# Patient Record
Sex: FEMALE | Race: WHITE | Employment: UNEMPLOYED | URBAN - METROPOLITAN AREA
[De-identification: names, ages, dates, MRNs, and addresses within clinical notes are randomized per-mention and may not be internally consistent; named-entity substitution may affect disease eponyms.]

---

## 2017-04-06 ENCOUNTER — TELEPHONE (OUTPATIENT)
Dept: FAMILY MEDICINE CLINIC | Age: 52
End: 2017-04-06

## 2017-04-18 ENCOUNTER — OFFICE VISIT (OUTPATIENT)
Dept: FAMILY MEDICINE CLINIC | Age: 52
End: 2017-04-18

## 2017-04-18 VITALS
WEIGHT: 189.8 LBS | SYSTOLIC BLOOD PRESSURE: 122 MMHG | HEART RATE: 68 BPM | DIASTOLIC BLOOD PRESSURE: 76 MMHG | RESPIRATION RATE: 12 BRPM | BODY MASS INDEX: 31.62 KG/M2 | HEIGHT: 65 IN

## 2017-04-18 DIAGNOSIS — Z00.00 WELL ADULT HEALTH CHECK: Primary | ICD-10-CM

## 2017-04-18 PROCEDURE — 99386 PREV VISIT NEW AGE 40-64: CPT | Performed by: FAMILY MEDICINE

## 2017-04-18 RX ORDER — FLUOXETINE HYDROCHLORIDE 20 MG/1
20 CAPSULE ORAL DAILY
Status: ON HOLD | COMMUNITY
End: 2017-10-31 | Stop reason: ALTCHOICE

## 2017-04-18 ASSESSMENT — ENCOUNTER SYMPTOMS
RESPIRATORY NEGATIVE: 1
GASTROINTESTINAL NEGATIVE: 1

## 2017-10-31 ENCOUNTER — ANESTHESIA EVENT (OUTPATIENT)
Dept: ENDOSCOPY | Age: 52
End: 2017-10-31
Payer: COMMERCIAL

## 2017-10-31 ENCOUNTER — HOSPITAL ENCOUNTER (OUTPATIENT)
Age: 52
Setting detail: OUTPATIENT SURGERY
Discharge: HOME | End: 2017-10-31
Attending: INTERNAL MEDICINE | Admitting: INTERNAL MEDICINE
Payer: COMMERCIAL

## 2017-10-31 ENCOUNTER — ANESTHESIA (OUTPATIENT)
Dept: ENDOSCOPY | Age: 52
End: 2017-10-31
Payer: COMMERCIAL

## 2017-10-31 VITALS
HEART RATE: 74 BPM | DIASTOLIC BLOOD PRESSURE: 76 MMHG | WEIGHT: 178 LBS | BODY MASS INDEX: 30.39 KG/M2 | OXYGEN SATURATION: 100 % | TEMPERATURE: 98.1 F | RESPIRATION RATE: 16 BRPM | HEIGHT: 64 IN | SYSTOLIC BLOOD PRESSURE: 123 MMHG

## 2017-10-31 VITALS
SYSTOLIC BLOOD PRESSURE: 113 MMHG | OXYGEN SATURATION: 99 % | DIASTOLIC BLOOD PRESSURE: 55 MMHG | RESPIRATION RATE: 16 BRPM

## 2017-10-31 PROCEDURE — 7100000000 HC PACU RECOVERY - FIRST 15 MIN: Performed by: INTERNAL MEDICINE

## 2017-10-31 PROCEDURE — 3609027000 HC COLONOSCOPY: Performed by: INTERNAL MEDICINE

## 2017-10-31 PROCEDURE — 3700000001 HC ADD 15 MINUTES (ANESTHESIA): Performed by: INTERNAL MEDICINE

## 2017-10-31 PROCEDURE — 3700000000 HC ANESTHESIA ATTENDED CARE: Performed by: INTERNAL MEDICINE

## 2017-10-31 PROCEDURE — 2580000003 HC RX 258: Performed by: INTERNAL MEDICINE

## 2017-10-31 PROCEDURE — 88305 TISSUE EXAM BY PATHOLOGIST: CPT

## 2017-10-31 PROCEDURE — 3609010600 HC COLONOSCOPY POLYPECTOMY SNARE/COLD BIOPSY: Performed by: INTERNAL MEDICINE

## 2017-10-31 PROCEDURE — 6360000002 HC RX W HCPCS: Performed by: NURSE ANESTHETIST, CERTIFIED REGISTERED

## 2017-10-31 PROCEDURE — 7100000001 HC PACU RECOVERY - ADDTL 15 MIN: Performed by: INTERNAL MEDICINE

## 2017-10-31 PROCEDURE — 2500000003 HC RX 250 WO HCPCS: Performed by: NURSE ANESTHETIST, CERTIFIED REGISTERED

## 2017-10-31 RX ORDER — SODIUM CHLORIDE 450 MG/100ML
INJECTION, SOLUTION INTRAVENOUS CONTINUOUS
Status: DISCONTINUED | OUTPATIENT
Start: 2017-10-31 | End: 2017-10-31 | Stop reason: HOSPADM

## 2017-10-31 RX ORDER — PROPOFOL 10 MG/ML
INJECTION, EMULSION INTRAVENOUS PRN
Status: DISCONTINUED | OUTPATIENT
Start: 2017-10-31 | End: 2017-10-31 | Stop reason: SDUPTHER

## 2017-10-31 RX ORDER — LIDOCAINE HYDROCHLORIDE 20 MG/ML
INJECTION, SOLUTION INFILTRATION; PERINEURAL PRN
Status: DISCONTINUED | OUTPATIENT
Start: 2017-10-31 | End: 2017-10-31 | Stop reason: SDUPTHER

## 2017-10-31 RX ORDER — M-VIT,TX,IRON,MINS/CALC/FOLIC 27MG-0.4MG
1 TABLET ORAL DAILY
COMMUNITY

## 2017-10-31 RX ADMIN — PROPOFOL 50 MG: 10 INJECTION, EMULSION INTRAVENOUS at 12:43

## 2017-10-31 RX ADMIN — PROPOFOL 50 MG: 10 INJECTION, EMULSION INTRAVENOUS at 12:57

## 2017-10-31 RX ADMIN — PROPOFOL 50 MG: 10 INJECTION, EMULSION INTRAVENOUS at 12:37

## 2017-10-31 RX ADMIN — SODIUM CHLORIDE: 4.5 INJECTION, SOLUTION INTRAVENOUS at 12:15

## 2017-10-31 RX ADMIN — PROPOFOL 50 MG: 10 INJECTION, EMULSION INTRAVENOUS at 12:38

## 2017-10-31 RX ADMIN — LIDOCAINE HYDROCHLORIDE 60 MG: 20 INJECTION, SOLUTION INFILTRATION; PERINEURAL at 12:37

## 2017-10-31 RX ADMIN — PROPOFOL 50 MG: 10 INJECTION, EMULSION INTRAVENOUS at 12:51

## 2017-10-31 ASSESSMENT — PAIN SCALES - GENERAL: PAINLEVEL_OUTOF10: 0

## 2017-10-31 NOTE — ANESTHESIA PRE PROCEDURE
12.8 oz (86.1 kg)     Body mass index is 30.55 kg/m². CBC: No results found for: WBC, RBC, HGB, HCT, MCV, RDW, PLT    CMP: No results found for: NA, K, CL, CO2, BUN, CREATININE, GFRAA, AGRATIO, LABGLOM, GLUCOSE, PROT, CALCIUM, BILITOT, ALKPHOS, AST, ALT    POC Tests: No results for input(s): POCGLU, POCNA, POCK, POCCL, POCBUN, POCHEMO, POCHCT in the last 72 hours. Coags: No results found for: PROTIME, INR, APTT    HCG (If Applicable): No results found for: PREGTESTUR, PREGSERUM, HCG, HCGQUANT     ABGs: No results found for: PHART, PO2ART, JBM3PGX, YTU6EVB, BEART, F4YIWVJE     Type & Screen (If Applicable):  No results found for: LABABO, [address]    Anesthesia Evaluation  Patient summary reviewed and Nursing notes reviewed no history of anesthetic complications:   Airway: Mallampati: II  TM distance: >3 FB   Neck ROM: full  Mouth opening: > = 3 FB Dental:          Pulmonary:negative ROS and normal exam                               Cardiovascular:  Exercise tolerance: good (>4 METS),                     Neuro/Psych:   {neg ROS              GI/Hepatic/Renal:   (+) bowel prep          Endo/Other: negative ROS                    Abdominal:           Vascular:                                      Anesthesia Plan      MAC     ASA 1       Induction: intravenous. Anesthetic plan and risks discussed with patient. Plan discussed with attending.                   Kodak Pantoja CRNA   10/31/2017

## 2017-10-31 NOTE — CONSULTS
[address] [facility]  Sedation/Analgesia Post Sedation Record    Patient: Heaven Lang : 1965  Med Rec#: [id number] Acc#: [id number]   Procedure Performed By: Flash Bowers  Primary Care Physician: Robert Diego DO    POST-PROCEDURE    Physicians/Assistants: Flash Bowers  Procedure Performed:    Sedation/Anesthesia:     Estimated Blood Loss:          ml  Specimens Removed:  []None [x]Other:      Disposition of Specimen:  [x]Pathology []Other      Complications:   [x]None Immediate []Other:     Post-procedure Diagnosis/Findings:           Polyps  Recommendations:               Flash Bowers MD [facility]  Electronically signed 10/31/2017 at 1:05 PM

## 2017-10-31 NOTE — ANESTHESIA POSTPROCEDURE EVALUATION
Department of Anesthesiology  Postprocedure Note    Patient: Tejal Mansfield  MRN: [id number]  YOB: 1965  Date of evaluation: 10/31/2017  Time:  1:02 PM     Procedure Summary     Date:  10/31/17 Room / Location:  [address] ENDO 6 / [address] Endoscopy    Anesthesia Start:  1234 Anesthesia Stop:  0056    Procedure:  COLONOSCOPY (N/A Anus) Diagnosis:  (SCREENING)    Surgeon:  Dio Cardenas MD Responsible Provider:  Lula Pulido MD    Anesthesia Type:  MAC ASA Status:  1          Anesthesia Type: MAC    Tran Phase I: Tran Score: 10    Tran Phase II:      Last vitals: Reviewed and per EMR flowsheets.        Anesthesia Post Evaluation    Patient location during evaluation: bedside  Patient participation: complete - patient participated  Level of consciousness: awake and alert  Airway patency: patent  Nausea & Vomiting: no nausea and no vomiting  Complications: no  Cardiovascular status: hemodynamically stable  Respiratory status: acceptable, room air and spontaneous ventilation  Hydration status: euvolemic

## 2017-10-31 NOTE — CONSULTS
[facility]  Sedation/Analgesia History & Physical    Patient: Niru Francois : 1965  Med Rec#: [id number] Acc#: [id number]   Provider Performing Procedure: Erwin Khan  Primary Care Physician: Candice Carlson DO    PRE-PROCEDURE   Full CODE [x]Yes  DNR-CCA/DNR-CC []Yes   Brief History/Pre-Procedure Diagnosis:sc         MEDICAL HISTORY  []CAD/Valve  []Liver Disease  []Lung Disease []Diabetes  []Hypertension []Renal Disease  []Additional information:       has a past medical history of Congenital kidney disease. SURGICAL HISTORY   has a past surgical history that includes  section ( & ). Additional information:       ALLERGIES   Allergies as of 2017    (No Known Allergies)     Additional information:       MEDICATIONS   Coumadin Use Last 7 Days [x]No []Yes  Antiplatelet drug therapy use last 7 days  [x]No []Yes  Other anticoagulant use last 7 days  [x]No []Yes  No current facility-administered medications for this encounter. Prior to Admission medications    Medication Sig Start Date End Date Taking? Authorizing Provider   FLUoxetine (PROZAC) 20 MG capsule Take 20 mg by mouth daily Take 14 days prior to start of menses    Historical Provider, MD     Additional information:       PHYSICAL:   Heart:  [x]Regular rate and rhythm  []Other:    Lungs:  [x]Clear    []Other:    Abdomen: [x]Soft    []Other:    Mental Status: [x]Alert & Oriented  []Other:      VITAL SIGNS   No data found. PLANNED PROCEDURE  []EGD  [x]Colonoscopy []Flex Sigmoid  []ERCP []EUS   []Cystoscopy  [] CATH [] BRONCH   Consent: I have discussed with the patient and/or the patient representative the indication, alternatives, and the possible risks and/or complications of the planned procedure and the anesthesia methods. The patient and/or patient representative appear to understand and agree to proceed.   SEDATION  Planned agent:[]Midazolam []Meperidine []Sublimaze []Morphine  []Diazepam [x]Propofol  []Other:     ASA Classification: Class 1 - A normal healthy patient    Airway Assessment: normal    Monitoring and Safety: The patient will be placed on a cardiac monitor and vital signs, pulse oximetry and level of consciousness will be continuously evaluated throughout the procedure. The patient will be closely monitored until recovery from the medications is complete and the patient has returned to baseline status. Respiratory therapy will be on standby during the procedure. [x]Pre-procedure diagnostic studies complete and results available. Comment:    [x]Previous sedation/anesthesia experiences assessed. Comment:    [x]The patient is an appropriate candidate to undergo the planned procedure sedation and anesthesia. (Refer to nursing sedation/analgesia documentation record)  [x]Formulation and discussion of sedation/procedure plan, risks, and expectations with patient and/or responsible adult completed. [x]Patient examined immediately prior to the procedure.  (Refer to nursing sedation/analgesia documentation record)    Trace Navas MD   Electronically signed 10/31/2017 at 11:52 AM

## 2017-11-01 NOTE — PROCEDURES
[address] [place] [address]                                PROCEDURE NOTE    PATIENT NAME: Donivan Dubin                      :             1965  MED REC NO:   [id number]                            ROOM:  ACCOUNT NO:   [de-identified]                            ADMISSION DATE:  10/31/2017  PROVIDER:     Lindsey Arroyo M.D.    DATE OF PROCEDURE:    PROCEDURE:  Colonoscopy plus snare plus biopsy. INDICATION FOR THE PROCEDURE:  The patient is here for screening  colonoscopy. Grandfather with colon cancer. The patient is enjoying  good health. See the preop note for rest of clinicals. ASA CLASSIFICATION:  I.    MEDICATION:  Per anesthesia. BIOPSY:  Yes. PHOTOGRAPHS:  Yes. DESCRIPTION OF THE PROCEDURE:  Informed consent was obtained after  explaining risks and benefits of the procedure and conscious sedation. Possible complications including bleeding, perforation, reaction to  medicine, but not limiting to death were discussed. Afterwards, the  patient was sedated in incremental fashion and PCF-180 colonoscope was  advanced under direct visualization to the cecum. There was a small  flat polyp close to appendicular orifice was removed with cold biopsy. Cold snare was tried, but it will slip, there was complete removal.   In the ascending colon, there was a fat polyp highly suspicious for  serrated adenoma, removed with cold snare. Transverse colon and  descending were normal, but in sigmoid there were 2 polyps next to  each other, one removed with cold snare and one with cold biopsy. Rectum was normal.  Tolerated the procedure well. IMPRESSION:  Multiple colon polyps total of 4, polypectomy plus cold  biopsy. RECOMMENDATION:  Followup colonoscopy in 3 years.         Kit Stapleton M.D.    D: 10/31/2017 13:15:05       T: 10/31/2017 14:27:43     [email or web address]  Job#: [id number]     Doc#: [id number]    CC:   physician.

## 2017-11-07 ENCOUNTER — TELEPHONE (OUTPATIENT)
Dept: FAMILY MEDICINE CLINIC | Age: 52
End: 2017-11-07

## 2018-04-07 LAB
CHOLESTEROL, TOTAL: 203 MG/DL
CHOLESTEROL/HDL RATIO: 4.8
HDLC SERPL-MCNC: 42 MG/DL (ref 35–70)
LDL CHOLESTEROL CALCULATED: 124 MG/DL (ref 0–160)
TRIGL SERPL-MCNC: 185 MG/DL
VLDLC SERPL CALC-MCNC: 37 MG/DL

## 2018-07-05 ENCOUNTER — OFFICE VISIT (OUTPATIENT)
Dept: FAMILY MEDICINE CLINIC | Age: 53
End: 2018-07-05
Payer: COMMERCIAL

## 2018-07-05 VITALS
HEART RATE: 88 BPM | BODY MASS INDEX: 36.36 KG/M2 | WEIGHT: 185.2 LBS | SYSTOLIC BLOOD PRESSURE: 136 MMHG | DIASTOLIC BLOOD PRESSURE: 80 MMHG | HEIGHT: 60 IN | TEMPERATURE: 99.6 F | RESPIRATION RATE: 20 BRPM

## 2018-07-05 DIAGNOSIS — N10 ACUTE PYELONEPHRITIS: Primary | ICD-10-CM

## 2018-07-05 PROCEDURE — 99213 OFFICE O/P EST LOW 20 MIN: CPT | Performed by: FAMILY MEDICINE

## 2018-07-05 RX ORDER — LEVOFLOXACIN 750 MG/1
750 TABLET ORAL DAILY
Qty: 5 TABLET | Refills: 0 | Status: SHIPPED | OUTPATIENT
Start: 2018-07-05 | End: 2021-06-23 | Stop reason: SDUPTHER

## 2018-07-05 RX ORDER — FLUOXETINE HYDROCHLORIDE 20 MG/1
20 CAPSULE ORAL
COMMUNITY

## 2018-07-05 ASSESSMENT — ENCOUNTER SYMPTOMS
ABDOMINAL PAIN: 1
VOMITING: 1
RESPIRATORY NEGATIVE: 1
NAUSEA: 1

## 2018-07-05 ASSESSMENT — PATIENT HEALTH QUESTIONNAIRE - PHQ9
2. FEELING DOWN, DEPRESSED OR HOPELESS: 0
SUM OF ALL RESPONSES TO PHQ QUESTIONS 1-9: 0
1. LITTLE INTEREST OR PLEASURE IN DOING THINGS: 0
SUM OF ALL RESPONSES TO PHQ9 QUESTIONS 1 & 2: 0

## 2018-07-05 NOTE — PROGRESS NOTES
Visit Information    Have you changed or started any medications since your last visit including any over-the-counter medicines, vitamins, or herbal medicines? no   Are you having any side effects from any of your medications? -  no  Have you stopped taking any of your medications? Is so, why? -  no    Have you seen any other physician or provider since your last visit? No  Have you had any other diagnostic tests since your last visit? No  Have you been seen in the emergency room and/or had an admission to a hospital since we last saw you? No  Have you had your routine dental cleaning in the past 6 months? yes - 5/2018    Have you activated your [facility] account? If not, what are your barriers?  Yes     Patient Care Team:  Rosa Iyer DO as PCP - General (Family Medicine)    Medical History Review  Past Medical, Family, and Social History reviewed and does contribute to the patient presenting condition    Health Maintenance   Topic Date Due    Hepatitis C screen  1965    HIV screen  10/27/1980    DTaP/Tdap/Td vaccine (1 - Tdap) 10/27/1984    Cervical cancer screen  10/27/1986    Diabetes screen  10/27/2005    Shingles Vaccine (1 of 2 - 2 Dose Series) 10/27/2015    Flu vaccine (1) 09/01/2018    Breast cancer screen  02/06/2019    Lipid screen  04/07/2023    Colon cancer screen colonoscopy  10/31/2027
oriented to person, place, and time. She appears well-developed and well-nourished. She has a sickly appearance. HENT:   Head: Normocephalic and atraumatic. Right Ear: Tympanic membrane normal.   Left Ear: Tympanic membrane normal.   Mouth/Throat: Oropharynx is clear and moist and mucous membranes are normal.   Cardiovascular: Normal rate, regular rhythm and normal heart sounds. No murmur heard. Pulmonary/Chest: Effort normal and breath sounds normal.   Abdominal: Soft. Bowel sounds are normal. There is tenderness in the suprapubic area. There is CVA tenderness. Musculoskeletal: She exhibits no edema. Neurological: She is alert and oriented to person, place, and time. Skin: Skin is warm and dry. Psychiatric: She has a normal mood and affect. Her behavior is normal.   Nursing note and vitals reviewed. Assessment:       Diagnosis Orders   1.  Acute pyelonephritis  levofloxacin (LEVAQUIN) 750 MG tablet           Plan:      -  Rest, fluids  -  rx Levaquin sent  -  ED if no improvement in next 24-48 hrs

## 2018-07-07 LAB — URINE CULTURE, ROUTINE: ABNORMAL

## 2019-03-08 LAB
AVERAGE GLUCOSE: NORMAL
CHOLESTEROL, TOTAL: 215 MG/DL
CHOLESTEROL/HDL RATIO: NORMAL
HBA1C MFR BLD: 6 %
HDLC SERPL-MCNC: 38 MG/DL (ref 35–70)
LDL CHOLESTEROL CALCULATED: 137 MG/DL (ref 0–160)
TRIGL SERPL-MCNC: 201 MG/DL
TSH SERPL DL<=0.05 MIU/L-ACNC: 1.58 UIU/ML
VLDLC SERPL CALC-MCNC: NORMAL MG/DL

## 2019-05-06 ENCOUNTER — OFFICE VISIT (OUTPATIENT)
Dept: FAMILY MEDICINE CLINIC | Age: 54
End: 2019-05-06
Payer: COMMERCIAL

## 2019-05-06 VITALS
HEIGHT: 61 IN | RESPIRATION RATE: 12 BRPM | HEART RATE: 68 BPM | SYSTOLIC BLOOD PRESSURE: 136 MMHG | DIASTOLIC BLOOD PRESSURE: 88 MMHG | BODY MASS INDEX: 37.19 KG/M2 | WEIGHT: 197 LBS

## 2019-05-06 DIAGNOSIS — Z00.00 WELL ADULT HEALTH CHECK: Primary | ICD-10-CM

## 2019-05-06 PROCEDURE — 99396 PREV VISIT EST AGE 40-64: CPT | Performed by: FAMILY MEDICINE

## 2019-05-06 ASSESSMENT — PATIENT HEALTH QUESTIONNAIRE - PHQ9
SUM OF ALL RESPONSES TO PHQ QUESTIONS 1-9: 0
1. LITTLE INTEREST OR PLEASURE IN DOING THINGS: 0
SUM OF ALL RESPONSES TO PHQ QUESTIONS 1-9: 0
2. FEELING DOWN, DEPRESSED OR HOPELESS: 0
SUM OF ALL RESPONSES TO PHQ9 QUESTIONS 1 & 2: 0

## 2019-05-06 ASSESSMENT — ENCOUNTER SYMPTOMS
RESPIRATORY NEGATIVE: 1
GASTROINTESTINAL NEGATIVE: 1

## 2019-05-06 NOTE — PROGRESS NOTES
Chronic Disease Visit Information    BP Readings from Last 3 Encounters:   05/06/19 136/88   07/05/18 136/80   10/31/17 123/76          Hemoglobin A1C (%)   Date Value   03/08/2019 6.0     LDL Calculated (mg/dL)   Date Value   03/08/2019 137     HDL (mg/dL)   Date Value   03/08/2019 38            Have you changed or started any medications since your last visit including any over-the-counter medicines, vitamins, or herbal medicines? no   Are you having any side effects from any of your medications? -  no  Have you stopped taking any of your medications? Is so, why? -  no    Have you seen any other physician or provider since your last visit? No  Have you had any other diagnostic tests since your last visit? Yes - Records Obtained  Have you been seen in the emergency room and/or had an admission to a hospital since we last saw you? No  Have you had your annual diabetic retinal (eye) exam? n/a  Have you had your routine dental cleaning in the past 6 months? n/a    Have you activated your [employer] account? If not, what are your barriers?  Yes     Patient Care Team:  Dorita Burkett DO as PCP - General (Family Medicine)  Marcus Oneil MD as Obstetrician (Obstetrics & Gynecology)         Medical History Review  Past Medical, Family, and Social History reviewed and does contribute to the patient presenting condition    Health Maintenance   Topic Date Due    Hepatitis C screen  1965    HIV screen  10/27/1980    DTaP/Tdap/Td vaccine (1 - Tdap) 10/27/1984    Cervical cancer screen  10/27/1986    Shingles Vaccine (1 of 2) 10/27/2015    Breast cancer screen  02/06/2019    Flu vaccine (Season Ended) 09/01/2019    A1C test (Diabetic or Prediabetic)  03/08/2020    Lipid screen  03/08/2024    Colon cancer screen colonoscopy  10/31/2027    Pneumococcal 0-64 years Vaccine  Aged Out

## 2019-05-06 NOTE — PROGRESS NOTES
Subjective:      Patient ID: Luke Rivera is a 48 y.o. female. HPI:    Chief Complaint   Patient presents with    Annual Exam     NEEDS MAMMOGRAM    Results     Annual eval.  Doing well overall. BP well controlled. BP Readings from Last 3 Encounters:   19 136/88   18 136/80   10/31/17 123/76     Weight is up from this time last year. Wt Readings from Last 3 Encounters:   19 197 lb (89.4 kg)   18 185 lb 3.2 oz (84 kg)   10/31/17 178 lb (80.7 kg)     On Prozac every 2 weeks for PMS. Past Surgical History:   Procedure Laterality Date     SECTION   &     COLONOSCOPY  10/31/2017    CA COLON CA SCRN NOT  [address] IND N/A 10/31/2017    COLONOSCOPY performed by Jie Pean MD at [facility] W/RMVL OF TUMOR POLYP LESION [address] TQ  10/31/2017    COLONOSCOPY POLYPECTOMY SNARE/COLD BIOPSY performed by Jie Pena MD at [facility]     Prior to Admission medications    Medication Sig Start Date End Date Taking? Authorizing Provider   FLUoxetine (PROZAC) 20 MG capsule Take 20 mg by mouth Take one tablet 14 days out of the month   Yes Historical Provider, MD   Multiple Vitamins-Minerals (THERAPEUTIC MULTIVITAMIN-MINERALS) tablet Take 1 tablet by mouth daily   Yes Historical Provider, MD         Review of Systems   Constitutional: Negative. HENT: Negative. Respiratory: Negative. Cardiovascular: Negative. Gastrointestinal: Negative. Musculoskeletal: Negative. All other systems reviewed and are negative. Objective:   Physical Exam   Constitutional: She is oriented to person, place, and time. She appears well-developed and well-nourished. HENT:   Head: Normocephalic and atraumatic. Right Ear: Tympanic membrane normal.   Left Ear: Tympanic membrane normal.   Mouth/Throat: Oropharynx is clear and moist and mucous membranes are normal.   Neck: Carotid bruit is not present.    Cardiovascular: Normal rate, regular rhythm and normal heart sounds. No murmur heard. Pulmonary/Chest: Effort normal and breath sounds normal.   Abdominal: Soft. Bowel sounds are normal.   Musculoskeletal: She exhibits no edema. Neurological: She is alert and oriented to person, place, and time. Skin: Skin is warm and dry. Psychiatric: She has a normal mood and affect. Her behavior is normal.   Nursing note and vitals reviewed. Assessment:       Diagnosis Orders   1.  Well adult health check             Plan:      -  Healthy lifestyle discussed  -  Labs reviewed, look fine  -  Well female UTD  -  Due for mammogram, will set up on her own  -  RTO annually        Edwin Howard DO

## 2021-06-23 ENCOUNTER — VIRTUAL VISIT (OUTPATIENT)
Dept: FAMILY MEDICINE CLINIC | Age: 56
End: 2021-06-23
Payer: COMMERCIAL

## 2021-06-23 ENCOUNTER — TELEPHONE (OUTPATIENT)
Dept: FAMILY MEDICINE CLINIC | Age: 56
End: 2021-06-23

## 2021-06-23 DIAGNOSIS — N10 ACUTE PYELONEPHRITIS: ICD-10-CM

## 2021-06-23 PROCEDURE — 99214 OFFICE O/P EST MOD 30 MIN: CPT | Performed by: FAMILY MEDICINE

## 2021-06-23 RX ORDER — LEVOFLOXACIN 750 MG/1
750 TABLET ORAL DAILY
Qty: 5 TABLET | Refills: 0 | Status: SHIPPED | OUTPATIENT
Start: 2021-06-23 | End: 2021-06-28

## 2021-06-23 ASSESSMENT — ENCOUNTER SYMPTOMS
NAUSEA: 1
ABDOMINAL PAIN: 1
RESPIRATORY NEGATIVE: 1
VOMITING: 1

## 2021-06-23 NOTE — TELEPHONE ENCOUNTER
----- Message from Unknown Dills sent at 6/23/2021  8:06 AM EDT -----  Subject: Appointment Request    Reason for Call: Urgent Adult Urinary Problem    QUESTIONS  Type of Appointment? Established Patient  Reason for appointment request? Available appointments did not meet   patient need  Additional Information for Provider? Patient has been experiencing UTI   symptoms since Sunday that including fever, chills, body ache, smell w/   the urine and urgency. Wanting to know if can come in to give a sample.  ---------------------------------------------------------------------------  --------------  CALL BACK INFO  What is the best way for the office to contact you? OK to leave message on   voicemail  Preferred Call Back Phone Number? [phone]  ---------------------------------------------------------------------------  --------------  SCRIPT ANSWERS  Relationship to Patient? Self  Appointment reason? Symptomatic  Select script based on patient symptoms? Adult Urinary Symptoms   [Urine-related, UTI, Bladder Infection]  Are you having severe back pain with your urinary symptoms? No  Are you having vomiting or nausea? Yes  Have you been diagnosed with, awaiting test results for, or told that you   are suspected of having COVID-19 (Coronavirus)? (If patient has tested   negative or was tested as a requirement for work, school, or travel and   not based on symptoms, answer no)? No  Do you currently have flu-like symptoms including fever or chills, cough,   shortness of breath, difficulty breathing, or new loss of taste or smell?    Yes

## 2021-06-23 NOTE — PROGRESS NOTES
Emily James (:  1965) is a 54 y.o. female,Established patient, here for evaluation of the following chief complaint(s): Fever and Urinary Tract Infection           SUBJECTIVE/OBJECTIVE:  HPI:    Chief Complaint   Patient presents with    Fever    Urinary Tract Infection       Pt presents today with c/o dysuria, frequency, urgency, flank pain for the last 5 days. Took some Azo with minimal relief. Does admit to fever of 100.6. She has had an episode NV. Hx of pyelo in 2018. There is no problem list on file for this patient. Past Surgical History:   Procedure Laterality Date     SECTION   &     COLONOSCOPY  10/31/2017    [place] COLON CA SCRN NOT  [address] IND N/A 10/31/2017    COLONOSCOPY performed by Teodoro Lipscomb MD at [address] [place] [address] W/RMVL OF TUMOR POLYP LESION SNARE TQ  10/31/2017    COLONOSCOPY POLYPECTOMY SNARE/COLD BIOPSY performed by Teodoro Lipscomb MD at [facility]     Social History     Tobacco Use    Smoking status: Never Smoker    Smokeless tobacco: Never Used   Substance Use Topics    Alcohol use: No    Drug use: No         Review of Systems   Constitutional: Positive for fever. HENT: Negative. Respiratory: Negative. Cardiovascular: Negative. Gastrointestinal: Positive for abdominal pain, nausea and vomiting. Genitourinary: Positive for dysuria, flank pain, frequency and urgency. All other systems reviewed and are negative. No flowsheet data found. Physical Exam  Constitutional:       General: She is not in acute distress. Appearance: Normal appearance. She is well-developed. She is not ill-appearing. HENT:      Head: Normocephalic and atraumatic. Right Ear: External ear normal.      Left Ear: External ear normal.   Eyes:      Conjunctiva/sclera: Conjunctivae normal.   Pulmonary:      Effort: Pulmonary effort is normal. No respiratory distress. Skin:     Findings: No rash (on exposed surfaces). Neurological:      Mental Status: She is alert and oriented to person, place, and time. Psychiatric:         Mood and Affect: Mood normal.         Behavior: Behavior normal.         Thought Content: Thought content normal.         Judgment: Judgment normal.     ASSESSMENT/PLAN:  1. Acute pyelonephritis  -     levoFLOXacin (LEVAQUIN) 750 MG tablet; Take 1 tablet by mouth daily for 5 days, Disp-5 tablet, R-0Normal    -  Symptoms c/w pyelo  -  Will treat empirically with Levaquin 750 mg daily x 5 days  -  Push fluids  -  Tylenol prn fever  -  Recommend ED if worsening symptoms                   Shant Victor Thuan, was evaluated through a synchronous (real-time) audio-video encounter. The patient (or guardian if applicable) is aware that this is a billable service. Verbal consent to proceed has been obtained within the past 12 months. The visit was conducted pursuant to the emergency declaration under the [address] [place] [address] authority and the [employer] General Act. Patient identification was verified, and a caregiver was present when appropriate. The patient was located in a state where the provider was credentialed to provide care. An electronic signature was used to authenticate this note.     --Fermin Orozco, DO

## 2021-11-16 ENCOUNTER — HOSPITAL ENCOUNTER (OUTPATIENT)
Age: 56
Setting detail: OUTPATIENT SURGERY
Discharge: HOME | End: 2021-11-16
Attending: INTERNAL MEDICINE | Admitting: INTERNAL MEDICINE
Payer: COMMERCIAL

## 2021-11-16 ENCOUNTER — ANESTHESIA (OUTPATIENT)
Dept: ENDOSCOPY | Age: 56
End: 2021-11-16
Payer: COMMERCIAL

## 2021-11-16 ENCOUNTER — ANESTHESIA EVENT (OUTPATIENT)
Dept: ENDOSCOPY | Age: 56
End: 2021-11-16
Payer: COMMERCIAL

## 2021-11-16 VITALS
TEMPERATURE: 96.3 F | HEIGHT: 60 IN | WEIGHT: 197 LBS | DIASTOLIC BLOOD PRESSURE: 87 MMHG | OXYGEN SATURATION: 96 % | SYSTOLIC BLOOD PRESSURE: 153 MMHG | HEART RATE: 82 BPM | RESPIRATION RATE: 18 BRPM | BODY MASS INDEX: 38.68 KG/M2

## 2021-11-16 VITALS
DIASTOLIC BLOOD PRESSURE: 92 MMHG | RESPIRATION RATE: 26 BRPM | OXYGEN SATURATION: 95 % | SYSTOLIC BLOOD PRESSURE: 164 MMHG

## 2021-11-16 PROCEDURE — 3609027000 HC COLONOSCOPY: Performed by: INTERNAL MEDICINE

## 2021-11-16 PROCEDURE — 2580000003 HC RX 258: Performed by: INTERNAL MEDICINE

## 2021-11-16 PROCEDURE — 6360000002 HC RX W HCPCS: Performed by: REGISTERED NURSE

## 2021-11-16 PROCEDURE — 7100000010 HC PHASE II RECOVERY - FIRST 15 MIN: Performed by: INTERNAL MEDICINE

## 2021-11-16 PROCEDURE — 3700000000 HC ANESTHESIA ATTENDED CARE: Performed by: INTERNAL MEDICINE

## 2021-11-16 PROCEDURE — 2709999900 HC NON-CHARGEABLE SUPPLY: Performed by: INTERNAL MEDICINE

## 2021-11-16 RX ORDER — SODIUM CHLORIDE 0.9 % (FLUSH) 0.9 %
5-40 SYRINGE (ML) INJECTION PRN
Status: DISCONTINUED | OUTPATIENT
Start: 2021-11-16 | End: 2021-11-16 | Stop reason: HOSPADM

## 2021-11-16 RX ORDER — SODIUM CHLORIDE 0.9 % (FLUSH) 0.9 %
5-40 SYRINGE (ML) INJECTION EVERY 12 HOURS SCHEDULED
Status: DISCONTINUED | OUTPATIENT
Start: 2021-11-16 | End: 2021-11-16 | Stop reason: HOSPADM

## 2021-11-16 RX ORDER — SODIUM CHLORIDE 9 MG/ML
25 INJECTION, SOLUTION INTRAVENOUS PRN
Status: DISCONTINUED | OUTPATIENT
Start: 2021-11-16 | End: 2021-11-16 | Stop reason: HOSPADM

## 2021-11-16 RX ORDER — PROPOFOL 10 MG/ML
INJECTION, EMULSION INTRAVENOUS PRN
Status: DISCONTINUED | OUTPATIENT
Start: 2021-11-16 | End: 2021-11-16 | Stop reason: SDUPTHER

## 2021-11-16 RX ADMIN — SODIUM CHLORIDE 25 ML: 9 INJECTION, SOLUTION INTRAVENOUS at 13:07

## 2021-11-16 RX ADMIN — PROPOFOL 50 MG: 10 INJECTION, EMULSION INTRAVENOUS at 13:50

## 2021-11-16 RX ADMIN — PROPOFOL 50 MG: 10 INJECTION, EMULSION INTRAVENOUS at 13:46

## 2021-11-16 ASSESSMENT — PAIN - FUNCTIONAL ASSESSMENT: PAIN_FUNCTIONAL_ASSESSMENT: 0-10

## 2021-11-16 ASSESSMENT — PAIN SCALES - GENERAL
PAINLEVEL_OUTOF10: 0
PAINLEVEL_OUTOF10: 0

## 2021-11-16 NOTE — POST SEDATION
[address] [address]  Sedation/Analgesia Post Sedation Record    Patient: Kati Forde : 1965  Med Rec#: [id number] Acc#: [id number]   Procedure Performed By: Lex Mejía MD  Primary Care Physician: Dania Asif DO    POST-PROCEDURE    Physicians/Assistants: Lex Mejía MD  Procedure Performed:    Sedation/Anesthesia:     Estimated Blood Loss:          ml  Specimens Removed:  [x]None []Other:      Disposition of Specimen:  []Pathology [x]Other      Complications:   [x]None Immediate []Other:     Post-procedure Diagnosis/Findings:             Recommendations:           polyp  Lex Mejía MD, MD [facility]  Electronically signed 2021 at 2:05 PM

## 2021-11-16 NOTE — ANESTHESIA POSTPROCEDURE EVALUATION
Department of Anesthesiology  Postprocedure Note    Patient: Danika Jenkins  MRN: [id number]  YOB: 1965  Date of evaluation: 11/16/2021  Time:  2:02 PM     Procedure Summary     Date: 11/16/21 Room / Location: [address] [place] [address] / [address] [place] [address]    Anesthesia Start: 0397 Anesthesia Stop: 4868    Procedure: COLONOSCOPY (N/A ) Diagnosis: (HX COLON POLYPS)    Surgeons: Esteban Carver MD Responsible Provider: Karin Santoro MD    Anesthesia Type: MAC ASA Status: 2          Anesthesia Type: MAC    Tran Phase I: Tran Score: 10    Tran Phase II:      Last vitals: Reviewed and per EMR flowsheets.        Anesthesia Post Evaluation    Patient location during evaluation: PACU  Patient participation: complete - patient participated  Level of consciousness: awake  Pain score: 0  Airway patency: patent  Nausea & Vomiting: no vomiting and no nausea  Complications: no  Cardiovascular status: hemodynamically stable  Respiratory status: spontaneous ventilation and room air  Hydration status: stable

## 2021-11-16 NOTE — PROGRESS NOTES
Pt in phase 2 of recovery. Fully awake, denies pain. Tolerating liquids.  Doctor in to speak with family

## 2021-11-16 NOTE — H&P
[address] [address]  Sedation/Analgesia History & Physical    Patient: Pito Merino : 1965  Med Rec#: [id number] Acc#: [id number]   Provider Performing Procedure: Nunu Grossman MD  Primary Care Physician: Nii Smith DO    PRE-PROCEDURE   Full CODE [x]Yes  DNR-CCA/DNR-CC []Yes   Brief History/Pre-Procedure Diagnosis:polyp          MEDICAL HISTORY  []CAD/Valve  []Liver Disease  []Lung Disease []Diabetes  []Hypertension []Renal Disease  []Additional information:       has a past medical history of Congenital kidney disease. SURGICAL HISTORY   has a past surgical history that includes  section ( & ); Colonoscopy (10/31/2017); pr colon ca scrn not hi rsk ind (N/A, 10/31/2017); and pr colsc flx w/rmvl of tumor polyp lesion snare tq (10/31/2017). Additional information:       ALLERGIES   Allergies as of 10/21/2021    (No Known Allergies)     Additional information:       MEDICATIONS   Coumadin Use Last 7 Days [x]No []Yes  Antiplatelet drug therapy use last 7 days  [x]No []Yes  Other anticoagulant use last 7 days  [x]No []Yes    Current Facility-Administered Medications:     sodium chloride flush 0.9 % injection 5-40 mL, 5-40 mL, IntraVENous, 2 times per day, Nunu Grossman MD    sodium chloride flush 0.9 % injection 5-40 mL, 5-40 mL, IntraVENous, PRN, Nunu Grossman MD    0.9 % sodium chloride infusion, 25 mL, IntraVENous, PRN, Nunu Grossman MD  Prior to Admission medications    Medication Sig Start Date End Date Taking?  Authorizing Provider   FLUoxetine (PROZAC) 20 MG capsule Take 20 mg by mouth Take one tablet 14 days out of the month    Historical MD Hailey   Multiple Vitamins-Minerals (THERAPEUTIC MULTIVITAMIN-MINERALS) tablet Take 1 tablet by mouth daily    Historical Provider, MD     Additional information:       PHYSICAL:   Heart:  [x]Regular rate and rhythm  []Other:    Lungs:  [x]Clear    []Other:    Abdomen: [x]Soft    []Other:    Mental Status: [x]Alert & Oriented  []Other:      VITAL SIGNS   No data found. PLANNED PROCEDURE  []EGD  [x]Colonoscopy []Flex Sigmoid  []ERCP []EUS   []Cystoscopy  [] CATH [] BRONCH   Consent: I have discussed with the patient and/or the patient representative the indication, alternatives, and the possible risks and/or complications of the planned procedure and the anesthesia methods. The patient and/or patient representative appear to understand and agree to proceed. SEDATION  Planned agent:[]Midazolam []Meperidine []Sublimaze []Morphine  []Diazepam [x]Propofol  []Other:     ASA Classification: Class 2 - A normal healthy patient with mild systemic disease    Airway Assessment: normal    Monitoring and Safety: The patient will be placed on a cardiac monitor and vital signs, pulse oximetry and level of consciousness will be continuously evaluated throughout the procedure. The patient will be closely monitored until recovery from the medications is complete and the patient has returned to baseline status. Respiratory therapy will be on standby during the procedure. [x]Pre-procedure diagnostic studies complete and results available. Comment:    [x]Previous sedation/anesthesia experiences assessed. Comment:    [x]The patient is an appropriate candidate to undergo the planned procedure sedation and anesthesia. (Refer to nursing sedation/analgesia documentation record)  [x]Formulation and discussion of sedation/procedure plan, risks, and expectations with patient and/or responsible adult completed. [x]Patient examined immediately prior to the procedure.  (Refer to nursing sedation/analgesia documentation record)    Kelli Mcdonald MD, MD   Electronically signed 11/16/2021 at 12:52 PM

## 2021-11-16 NOTE — ANESTHESIA PRE PROCEDURE
Department of Anesthesiology  Preprocedure Note       Name:  Carina Henley   Age:  64 y.o.  :  1965                                          MRN:  [id number]         Date:  2021      Surgeon: Leana Lauren):  Jalen Carlson MD    Procedure: Procedure(s):  COLONOSCOPY    Medications prior to admission:   Prior to Admission medications    Medication Sig Start Date End Date Taking? Authorizing Provider   FLUoxetine (PROZAC) 20 MG capsule Take 20 mg by mouth Take one tablet 14 days out of the month   Yes Historical Provider, MD   Multiple Vitamins-Minerals (THERAPEUTIC MULTIVITAMIN-MINERALS) tablet Take 1 tablet by mouth daily   Yes Historical Provider, MD       Current medications:    Current Facility-Administered Medications   Medication Dose Route Frequency Provider Last Rate Last Admin    sodium chloride flush 0.9 % injection 5-40 mL  5-40 mL IntraVENous 2 times per day Jalen Carlson MD        sodium chloride flush 0.9 % injection 5-40 mL  5-40 mL IntraVENous PRN Jalen Carlson MD        0.9 % sodium chloride infusion  25 mL IntraVENous PRN Jalen Carlson  mL/hr at 21 1307 25 mL at 21 1307       Allergies: Allergies   Allergen Reactions    Contrast [Iodides] Hives       Problem List:  There is no problem list on file for this patient.       Past Medical History:        Diagnosis Date    Congenital kidney disease        Past Surgical History:        Procedure Laterality Date    BREAST SURGERY       SECTION   &     COLONOSCOPY  10/31/2017    MT COLON CA SCRN NOT HI RSK IND N/A 10/31/2017    COLONOSCOPY performed by Jalen Carlson MD at [address] [place] [address] W/RMVL OF TUMOR POLYP LESION SNARE TQ  10/31/2017    COLONOSCOPY POLYPECTOMY SNARE/COLD BIOPSY performed by Jalen Carlson MD at [facility]       Social History:    Social History     Tobacco Use    Smoking status: Never Smoker    Smokeless tobacco: Never Used   Substance Use Topics    Alcohol use: No                                Counseling given: Not Answered      Vital Signs (Current):   Vitals:    11/16/21 1301   BP: (!) 181/99   Pulse: 102   Resp: 16   Temp: 35.7 °C (96.3 °F)   TempSrc: Temporal   SpO2: 97%   Weight: 197 lb (89.4 kg)   Height: 5' (1.524 m)                                              BP Readings from Last 3 Encounters:   11/16/21 (!) 181/99   05/06/19 136/88   07/05/18 136/80       NPO Status: Time of last liquid consumption: 2230                        Time of last solid consumption: 2000                        Date of last liquid consumption: 11/15/21                        Date of last solid food consumption: 11/14/21    BMI:   Wt Readings from Last 3 Encounters:   11/16/21 197 lb (89.4 kg)   05/06/19 197 lb (89.4 kg)   07/05/18 185 lb 3.2 oz (84 kg)     Body mass index is 38.47 kg/m². CBC: No results found for: WBC, RBC, HGB, HCT, MCV, RDW, PLT    CMP: No results found for: NA, K, CL, CO2, BUN, CREATININE, GFRAA, AGRATIO, LABGLOM, GLUCOSE, PROT, CALCIUM, BILITOT, ALKPHOS, AST, ALT    POC Tests: No results for input(s): POCGLU, POCNA, POCK, POCCL, POCBUN, POCHEMO, POCHCT in the last 72 hours.     Coags: No results found for: PROTIME, INR, APTT    HCG (If Applicable): No results found for: PREGTESTUR, PREGSERUM, HCG, HCGQUANT     ABGs: No results found for: PHART, PO2ART, NXH9QOY, CSI6MMT, BEART, Y9PNELXA     Type & Screen (If Applicable):  No results found for: LABABO, LABRH    Drug/Infectious Status (If Applicable):  No results found for: HIV, HEPCAB    COVID-19 Screening (If Applicable): No results found for: COVID19        Anesthesia Evaluation  Patient summary reviewed and Nursing notes reviewed  Airway: Mallampati: II  TM distance: <3 FB   Neck ROM: full  Mouth opening: > = 3 FB Dental: normal exam         Pulmonary:Negative Pulmonary ROS and normal exam                               Cardiovascular:Negative CV ROS                      Neuro/Psych:   Negative Neuro/Psych ROS              GI/Hepatic/Renal: Neg GI/Hepatic/Renal ROS            Endo/Other: Negative Endo/Other ROS                    Abdominal:             Vascular: negative vascular ROS. Other Findings:             Anesthesia Plan      MAC     ASA 2       Induction: intravenous. Anesthetic plan and risks discussed with patient. Plan discussed with attending.                   TIFF Watson - CRNA   11/16/2021

## 2021-11-17 NOTE — OP NOTE
[address] [place] [address]                                OPERATIVE REPORT    PATIENT NAME: Daljit Cassidy                     :        1965  MED REC NO:   [id number]                           ROOM:  ACCOUNT NO:   [de-identified]                           ADMIT DATE: 2021  PROVIDER:     Jean Song M.D.    Trevor Washburnon OF PROCEDURE:  2021    SURGEON:  Jean Song MD    PROCEDURE:  Colonoscopy. INDICATION FOR THE PROCEDURE:  High-risk screening colon for the patient  had serrated adenoma in the past.  See the list of medications attached,  preop note for rest of clinicals. ASA CLASSIFICATION:  II.    MEDICATIONS:  Per Anesthesia. BIOPSY:  None. PHOTOGRAPHS:  Yes. DESCRIPTION OF THE PROCEDURE:  Informed consent was obtained after  explaining risks and benefits of the procedure and conscious sedation. Possible complications including bleeding, perforation, reaction to  medicine, but not limiting to death, were discussed. Afterwards, PCF-180 colonoscope was advanced under direct visualization  to the cecum, landmarks were identified. No polyp or mass seen in the  cecum, ascending colon, transverse colon, descending colon, sigmoid, or  rectum. Tolerated the procedure well. IMPRESSION:  History of serrated adenoma without recurrence. RECOMMENDATION:  Followup colonoscopy in five years. BLOOD LOSS:  None.         Kayla Salgado M.D.    D: 2021 14:15:52       T: 2021 16:55:10     [email or web address]  Job#: [id number]     Doc#: [id number]    CC:  Dwight Monroe D.O.

## 2022-08-17 ENCOUNTER — TELEPHONE (OUTPATIENT)
Dept: FAMILY MEDICINE CLINIC | Age: 57
End: 2022-08-17

## 2022-08-17 NOTE — TELEPHONE ENCOUNTER
Patient, she went for her OB appointment her BP was elevated 158/98. Noted elevated BP in 2021 in epic. Patient has had episodes of chest pain, fatigue, sweats and SOB with ambulation most recent Monday 8/15/22. In-office visit scheduled for tomorrow 8/18/22. Advised patient is any of those symptoms return go to the ED immediately. Patient voiced understanding.

## 2022-08-18 ENCOUNTER — OFFICE VISIT (OUTPATIENT)
Dept: FAMILY MEDICINE CLINIC | Age: 57
End: 2022-08-18
Payer: COMMERCIAL

## 2022-08-18 VITALS
WEIGHT: 199.2 LBS | HEART RATE: 76 BPM | BODY MASS INDEX: 38.9 KG/M2 | DIASTOLIC BLOOD PRESSURE: 80 MMHG | RESPIRATION RATE: 16 BRPM | SYSTOLIC BLOOD PRESSURE: 168 MMHG

## 2022-08-18 DIAGNOSIS — I10 HYPERTENSION, UNSPECIFIED TYPE: ICD-10-CM

## 2022-08-18 DIAGNOSIS — Q60.5 CONGENITAL SMALL KIDNEY: ICD-10-CM

## 2022-08-18 DIAGNOSIS — Z00.00 WELL ADULT HEALTH CHECK: Primary | ICD-10-CM

## 2022-08-18 PROCEDURE — 99396 PREV VISIT EST AGE 40-64: CPT | Performed by: FAMILY MEDICINE

## 2022-08-18 RX ORDER — AMLODIPINE BESYLATE AND BENAZEPRIL HYDROCHLORIDE 5; 10 MG/1; MG/1
1 CAPSULE ORAL DAILY
Qty: 30 CAPSULE | Refills: 0 | Status: SHIPPED | OUTPATIENT
Start: 2022-08-18 | End: 2022-09-01 | Stop reason: SDUPTHER

## 2022-08-18 SDOH — ECONOMIC STABILITY: FOOD INSECURITY: WITHIN THE PAST 12 MONTHS, THE FOOD YOU BOUGHT JUST DIDN'T LAST AND YOU DIDN'T HAVE MONEY TO GET MORE.: NEVER TRUE

## 2022-08-18 SDOH — ECONOMIC STABILITY: FOOD INSECURITY: WITHIN THE PAST 12 MONTHS, YOU WORRIED THAT YOUR FOOD WOULD RUN OUT BEFORE YOU GOT MONEY TO BUY MORE.: NEVER TRUE

## 2022-08-18 ASSESSMENT — ENCOUNTER SYMPTOMS
GASTROINTESTINAL NEGATIVE: 1
RESPIRATORY NEGATIVE: 1

## 2022-08-18 ASSESSMENT — PATIENT HEALTH QUESTIONNAIRE - PHQ9
SUM OF ALL RESPONSES TO PHQ QUESTIONS 1-9: 0
2. FEELING DOWN, DEPRESSED OR HOPELESS: 0
SUM OF ALL RESPONSES TO PHQ QUESTIONS 1-9: 0
SUM OF ALL RESPONSES TO PHQ9 QUESTIONS 1 & 2: 0
1. LITTLE INTEREST OR PLEASURE IN DOING THINGS: 0

## 2022-08-18 ASSESSMENT — SOCIAL DETERMINANTS OF HEALTH (SDOH): HOW HARD IS IT FOR YOU TO PAY FOR THE VERY BASICS LIKE FOOD, HOUSING, MEDICAL CARE, AND HEATING?: NOT HARD AT ALL

## 2022-08-18 NOTE — PROGRESS NOTES
Amira Quintana (:  1965) is a 64 y.o. female,Established patient, here for evaluation of the following chief complaint(s):  Hypertension (Elevated B/P at OB/GYN appt 150/58)        Subjective   SUBJECTIVE/OBJECTIVE:  HPI:    Chief Complaint   Patient presents with    Hypertension     Elevated B/P at OB/GYN appt 150/58     Pt here for elevated BP. Was recently at the [facility] office, 150/58. BP high also today. BP Readings from Last 3 Encounters:   22 (!) 168/80   21 (!) 153/87   21 (!) 164/92     Wt Readings from Last 3 Encounters:   22 199 lb 3.2 oz (90.4 kg)   21 197 lb (89.4 kg)   19 197 lb (89.4 kg)     Per pt, she has a congential small kidney. No hx of CKD per record. Patient Active Problem List   Diagnosis    Congenital small kidney    Hypertension     Past Surgical History:   Procedure Laterality Date    Margie John 50 & 2004    COLONOSCOPY  10/31/2017    COLONOSCOPY N/A 2021    COLONOSCOPY performed by Kit Gomez MD at [address] [facility] [address]  [address] [facility] N/A 10/31/2017    COLONOSCOPY performed by Kit Gomez MD at [address] [facility] W/RMVL OF TUMOR POLYP LESION [address] TQ  10/31/2017    COLONOSCOPY POLYPECTOMY SNARE/COLD BIOPSY performed by Kit Gomez MD at [facility]     Social History     Tobacco Use    Smoking status: Never    Smokeless tobacco: Never   Substance Use Topics    Alcohol use: No    Drug use: No       Review of Systems   Constitutional: Negative. HENT: Negative. Respiratory: Negative. Cardiovascular: Negative. Gastrointestinal: Negative. Musculoskeletal: Negative. All other systems reviewed and are negative. Objective   Physical Exam  Vitals and nursing note reviewed. Constitutional:       General: She is not in acute distress. Appearance: Normal appearance. She is well-developed. HENT:      Head: Normocephalic and atraumatic. Right Ear: Tympanic membrane normal.      Left Ear: Tympanic membrane normal.   Eyes:      Conjunctiva/sclera: Conjunctivae normal.   Cardiovascular:      Rate and Rhythm: Normal rate and regular rhythm. Heart sounds: Normal heart sounds. No murmur heard. Pulmonary:      Effort: Pulmonary effort is normal.      Breath sounds: Normal breath sounds. No wheezing, rhonchi or rales. Abdominal:      General: There is no distension. Musculoskeletal:      Cervical back: Neck supple. Skin:     General: Skin is warm and dry. Findings: No rash (on exposed surfaces). Neurological:      General: No focal deficit present. Mental Status: She is alert. Psychiatric:         Attention and Perception: Attention normal.         Mood and Affect: Mood normal.         Speech: Speech normal.         Behavior: Behavior normal. Behavior is cooperative. Thought Content: Thought content normal.         Judgment: Judgment normal.             ASSESSMENT/PLAN:  1. Well adult health check  -     Lipid Panel w/ Reflex Direct LDL; Future  -     Comprehensive Metabolic Panel; Future  -     Hemoglobin A1C; Future  -     TSH with Reflex; Future  -     CBC with Auto Differential; Future  2. Hypertension, unspecified type  -     amLODIPine-benazepril (LOTREL) 5-10 MG per capsule; Take 1 capsule by mouth daily, Disp-30 capsule, R-0Normal  3. Congenital small kidney    -  Healthy lifestyle discussed  -  Check labs  -  Start Lotrel    Return in about 2 weeks (around 9/1/2022) for HTN. An electronic signature was used to authenticate this note.     --Cary Blackwood DO

## 2022-08-22 LAB
ALBUMIN SERPL-MCNC: 4 G/DL
ALP BLD-CCNC: 90 U/L
ALT SERPL-CCNC: 30 U/L
ANION GAP SERPL CALCULATED.3IONS-SCNC: 1.2 MMOL/L
AST SERPL-CCNC: 23 U/L
AVERAGE GLUCOSE: NORMAL
BILIRUB SERPL-MCNC: 0.7 MG/DL (ref 0.1–1.4)
BUN BLDV-MCNC: 10 MG/DL
CALCIUM SERPL-MCNC: 9.2 MG/DL
CHLORIDE BLD-SCNC: 103 MMOL/L
CHOLESTEROL, TOTAL: 210 MG/DL
CHOLESTEROL/HDL RATIO: NORMAL
CO2: 25 MMOL/L
CREAT SERPL-MCNC: 1.03 MG/DL
GFR CALCULATED: 64
GLUCOSE BLD-MCNC: 149 MG/DL
HBA1C MFR BLD: 6.8 %
HDLC SERPL-MCNC: 36 MG/DL (ref 35–70)
LDL CHOLESTEROL CALCULATED: 138 MG/DL (ref 0–160)
NONHDLC SERPL-MCNC: NORMAL MG/DL
POTASSIUM SERPL-SCNC: 4.7 MMOL/L
SODIUM BLD-SCNC: 140 MMOL/L
TOTAL PROTEIN: 7.3
TRIGL SERPL-MCNC: 200 MG/DL
TSH SERPL DL<=0.05 MIU/L-ACNC: 1.6 UIU/ML
VLDLC SERPL CALC-MCNC: 36 MG/DL

## 2022-09-01 ENCOUNTER — OFFICE VISIT (OUTPATIENT)
Dept: FAMILY MEDICINE CLINIC | Age: 57
End: 2022-09-01
Payer: COMMERCIAL

## 2022-09-01 VITALS
SYSTOLIC BLOOD PRESSURE: 142 MMHG | DIASTOLIC BLOOD PRESSURE: 82 MMHG | HEART RATE: 98 BPM | BODY MASS INDEX: 38.71 KG/M2 | RESPIRATION RATE: 14 BRPM | WEIGHT: 198.2 LBS

## 2022-09-01 DIAGNOSIS — I10 HYPERTENSION, UNSPECIFIED TYPE: ICD-10-CM

## 2022-09-01 DIAGNOSIS — E78.00 PURE HYPERCHOLESTEROLEMIA: ICD-10-CM

## 2022-09-01 DIAGNOSIS — Q60.5 CONGENITAL SMALL KIDNEY: ICD-10-CM

## 2022-09-01 DIAGNOSIS — E11.9 NEW ONSET TYPE 2 DIABETES MELLITUS (HCC): Primary | ICD-10-CM

## 2022-09-01 PROCEDURE — 3044F HG A1C LEVEL LT 7.0%: CPT | Performed by: FAMILY MEDICINE

## 2022-09-01 PROCEDURE — G8417 CALC BMI ABV UP PARAM F/U: HCPCS | Performed by: FAMILY MEDICINE

## 2022-09-01 PROCEDURE — 1036F TOBACCO NON-USER: CPT | Performed by: FAMILY MEDICINE

## 2022-09-01 PROCEDURE — G8427 DOCREV CUR MEDS BY ELIG CLIN: HCPCS | Performed by: FAMILY MEDICINE

## 2022-09-01 PROCEDURE — 99214 OFFICE O/P EST MOD 30 MIN: CPT | Performed by: FAMILY MEDICINE

## 2022-09-01 PROCEDURE — 2022F DILAT RTA XM EVC RTNOPTHY: CPT | Performed by: FAMILY MEDICINE

## 2022-09-01 PROCEDURE — 3017F COLORECTAL CA SCREEN DOC REV: CPT | Performed by: FAMILY MEDICINE

## 2022-09-01 RX ORDER — AMLODIPINE BESYLATE AND BENAZEPRIL HYDROCHLORIDE 5; 10 MG/1; MG/1
1 CAPSULE ORAL DAILY
Qty: 90 CAPSULE | Refills: 3 | Status: SHIPPED | OUTPATIENT
Start: 2022-09-01

## 2022-09-01 ASSESSMENT — ENCOUNTER SYMPTOMS
RESPIRATORY NEGATIVE: 1
GASTROINTESTINAL NEGATIVE: 1

## 2022-09-01 NOTE — PROGRESS NOTES
Amira Quintana (:  1965) is a 64 y.o. female,Established patient, here for evaluation of the following chief complaint(s):  Hypertension (2 week, tolerating medication well )        Subjective   SUBJECTIVE/OBJECTIVE:  HPI:    Chief Complaint   Patient presents with    Hypertension     2 week, tolerating medication well      BP improved, still a little high. BP Readings from Last 3 Encounters:   22 (!) 142/82   22 (!) 168/80   21 (!) 153/87     New onset DM. Lab Results   Component Value Date    LABA1C 6.8 2022    LABA1C 6.0 2019     Lab Results   Component Value Date    LDLCALC 138 2022    CREATININE 1.03 2022     Wt Readings from Last 3 Encounters:   22 198 lb 3.2 oz (89.9 kg)   22 199 lb 3.2 oz (90.4 kg)   21 197 lb (89.4 kg)     Patient Active Problem List   Diagnosis    Congenital small kidney    Hypertension     Past Surgical History:   Procedure Laterality Date    Margie John 50 & 2004    COLONOSCOPY  10/31/2017    COLONOSCOPY N/A 2021    COLONOSCOPY performed by Kit Gomez MD at [address] [facility] NOT [facility] [address] [place] [address] N/A 10/31/2017    COLONOSCOPY performed by Kit Gomez MD at [address] [facility] W/RMVL OF TUMOR POLYP LESION SNARE TQ  10/31/2017    COLONOSCOPY POLYPECTOMY SNARE/COLD BIOPSY performed by Kit Gomez MD at [facility]     Social History     Tobacco Use    Smoking status: Never    Smokeless tobacco: Never   Substance Use Topics    Alcohol use: No    Drug use: No         Review of Systems   Constitutional: Negative. HENT: Negative. Respiratory: Negative. Cardiovascular: Negative. Gastrointestinal: Negative. Musculoskeletal: Negative. All other systems reviewed and are negative. Objective   Physical Exam  Vitals and nursing note reviewed. Constitutional:       General: She is not in acute distress.      Appearance: Normal appearance. She is well-developed. HENT:      Head: Normocephalic and atraumatic. Right Ear: Tympanic membrane normal.      Left Ear: Tympanic membrane normal.   Eyes:      Conjunctiva/sclera: Conjunctivae normal.   Cardiovascular:      Rate and Rhythm: Normal rate and regular rhythm. Heart sounds: Normal heart sounds. No murmur heard. Pulmonary:      Effort: Pulmonary effort is normal.      Breath sounds: Normal breath sounds. No wheezing, rhonchi or rales. Abdominal:      General: There is no distension. Musculoskeletal:      Cervical back: Neck supple. Skin:     General: Skin is warm and dry. Findings: No rash (on exposed surfaces). Neurological:      General: No focal deficit present. Mental Status: She is alert. Psychiatric:         Attention and Perception: Attention normal.         Mood and Affect: Mood normal.         Speech: Speech normal.         Behavior: Behavior normal. Behavior is cooperative. Thought Content: Thought content normal.         Judgment: Judgment normal.             ASSESSMENT/PLAN:  1. New onset type 2 diabetes mellitus ([facility]  -     Basic Metabolic Panel; Future  -     Hemoglobin A1C; Future  -     Microalbumin / Creatinine Urine Ratio; Future  2. Hypertension, unspecified type  -     amLODIPine-benazepril (LOTREL) 5-10 MG per capsule; Take 1 capsule by mouth daily, Disp-90 capsule, R-3Normal  3. Congenital small kidney  4. Pure hypercholesterolemia  -     Lipid Panel w/ Reflex Direct LDL; Future    -  Labs reviewed, abnormalities addressed  -  Continue Lotrel, refills sent  -  Dietary changes discussed to address #1, #4  -  Recheck labs 6 mos    Return in about 6 months (around 3/1/2023) for DM2. An electronic signature was used to authenticate this note.     --Lakshmi Fan, DO

## 2022-09-10 ENCOUNTER — APPOINTMENT (OUTPATIENT)
Dept: CT IMAGING | Age: 57
End: 2022-09-10
Payer: COMMERCIAL

## 2022-09-10 ENCOUNTER — APPOINTMENT (OUTPATIENT)
Dept: GENERAL RADIOLOGY | Age: 57
End: 2022-09-10
Payer: COMMERCIAL

## 2022-09-10 ENCOUNTER — HOSPITAL ENCOUNTER (EMERGENCY)
Age: 57
Discharge: HOME | End: 2022-09-10
Attending: STUDENT IN AN ORGANIZED HEALTH CARE EDUCATION/TRAINING PROGRAM
Payer: COMMERCIAL

## 2022-09-10 VITALS
HEIGHT: 60 IN | HEART RATE: 68 BPM | OXYGEN SATURATION: 94 % | SYSTOLIC BLOOD PRESSURE: 145 MMHG | BODY MASS INDEX: 38.87 KG/M2 | DIASTOLIC BLOOD PRESSURE: 77 MMHG | WEIGHT: 198 LBS | TEMPERATURE: 98.1 F | RESPIRATION RATE: 17 BRPM
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
## 2022-09-10 DIAGNOSIS — S46.912A STRAIN OF LEFT SHOULDER, INITIAL ENCOUNTER: ICD-10-CM

## 2022-09-10 DIAGNOSIS — S46.812A STRAIN OF LEFT TRAPEZIUS MUSCLE, INITIAL ENCOUNTER: ICD-10-CM

## 2022-09-10 DIAGNOSIS — S16.1XXA CERVICAL STRAIN, ACUTE, INITIAL ENCOUNTER: Primary | ICD-10-CM

## 2022-09-10 PROCEDURE — 73030 X-RAY EXAM OF SHOULDER: CPT

## 2022-09-10 PROCEDURE — 99284 EMERGENCY DEPT VISIT MOD MDM: CPT

## 2022-09-10 PROCEDURE — 72125 CT NECK SPINE W/O DYE: CPT

## 2022-09-10 ASSESSMENT — ENCOUNTER SYMPTOMS
CONSTIPATION: 0
DIARRHEA: 0
TROUBLE SWALLOWING: 0
COLOR CHANGE: 0
SORE THROAT: 0
NAUSEA: 0
SHORTNESS OF BREATH: 0
ABDOMINAL DISTENTION: 0
VOMITING: 0
ABDOMINAL PAIN: 0
SINUS PAIN: 0
COUGH: 0
SINUS PRESSURE: 0
BACK PAIN: 0
CHEST TIGHTNESS: 0

## 2022-09-10 ASSESSMENT — PAIN SCALES - GENERAL: PAINLEVEL_OUTOF10: 5

## 2022-09-10 ASSESSMENT — PAIN - FUNCTIONAL ASSESSMENT: PAIN_FUNCTIONAL_ASSESSMENT: 0-10

## 2022-09-10 ASSESSMENT — PAIN DESCRIPTION - LOCATION: LOCATION: SHOULDER

## 2022-09-10 ASSESSMENT — PAIN DESCRIPTION - ORIENTATION: ORIENTATION: LEFT

## 2022-09-10 NOTE — ED NOTES
Pt given an extra pillow to put under her left arm at this time. Pt and family updated on poc. No concerns voiced.      Lei Sen RN  09/10/22 [id number]
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
## 2022-09-12 ENCOUNTER — TELEPHONE (OUTPATIENT)
Dept: FAMILY MEDICINE CLINIC | Age: 57
End: 2022-09-12

## 2022-09-15 ENCOUNTER — TELEMEDICINE (OUTPATIENT)
Dept: FAMILY MEDICINE CLINIC | Age: 57
End: 2022-09-15
Payer: COMMERCIAL

## 2022-09-15 DIAGNOSIS — S43.402A SPRAIN OF LEFT SHOULDER, UNSPECIFIED SHOULDER SPRAIN TYPE, INITIAL ENCOUNTER: ICD-10-CM

## 2022-09-15 DIAGNOSIS — S16.1XXA ACUTE STRAIN OF NECK MUSCLE, INITIAL ENCOUNTER: ICD-10-CM
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
## 2022-09-15 PROCEDURE — 3017F COLORECTAL CA SCREEN DOC REV: CPT | Performed by: FAMILY MEDICINE

## 2022-09-15 PROCEDURE — G8428 CUR MEDS NOT DOCUMENT: HCPCS | Performed by: FAMILY MEDICINE

## 2022-09-15 PROCEDURE — 99213 OFFICE O/P EST LOW 20 MIN: CPT | Performed by: FAMILY MEDICINE

## 2022-09-15 ASSESSMENT — ENCOUNTER SYMPTOMS
RESPIRATORY NEGATIVE: 1
GASTROINTESTINAL NEGATIVE: 1
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
## 2022-09-19 ENCOUNTER — TELEPHONE (OUTPATIENT)
Dept: FAMILY MEDICINE CLINIC | Age: 57
End: 2022-09-19

## 2022-09-19 NOTE — TELEPHONE ENCOUNTER
Patients insurance is NOT covering the thyroid labs she had done at [address] 8/22/22. Lab is asking for a dx code per patient sister has hx of thyroid she takes daily medication for? Please review for a call back to patient and lab order.

## 2022-09-19 NOTE — TELEPHONE ENCOUNTER
Patient notified and a new dx code has been added to her thyroid panel and faxed to [address] [place] [address] for re-billing.

## 2022-10-24 ENCOUNTER — TELEPHONE (OUTPATIENT)
Dept: FAMILY MEDICINE CLINIC | Age: 57
End: 2022-10-24

## 2022-10-24 NOTE — TELEPHONE ENCOUNTER
Patient called stating she received another bill from [address] [place] [address] in regards to her thyroid lab order 8/22/22. Per patient asking to send order again with code E78.00 to [address] [place] [address] customer service at [phone]. Thyroid order printed and faxed to Eli[facility] Colmena[facility] as requested.

## 2023-02-28 LAB
AVERAGE GLUCOSE: NORMAL
BUN BLDV-MCNC: NORMAL MG/DL
CALCIUM SERPL-MCNC: NORMAL MG/DL
CHLORIDE BLD-SCNC: NORMAL MMOL/L
CHOLESTEROL, TOTAL: 187 MG/DL
CHOLESTEROL/HDL RATIO: NORMAL
CO2: NORMAL
CREAT SERPL-MCNC: NORMAL MG/DL
EGFR: NORMAL
GLUCOSE BLD-MCNC: NORMAL MG/DL
HBA1C MFR BLD: 6.5 %
HDLC SERPL-MCNC: 35 MG/DL (ref 35–70)
LDL CHOLESTEROL CALCULATED: 126 MG/DL (ref 0–160)
MICROALBUMIN UR-MCNC: 71.7
NONHDLC SERPL-MCNC: NORMAL MG/DL
POTASSIUM SERPL-SCNC: 4.5 MMOL/L
SODIUM BLD-SCNC: NORMAL MMOL/L
TRIGL SERPL-MCNC: 146 MG/DL
VLDLC SERPL CALC-MCNC: 26 MG/DL

## 2023-03-02 ENCOUNTER — OFFICE VISIT (OUTPATIENT)
Dept: FAMILY MEDICINE CLINIC | Age: 58
End: 2023-03-02
Payer: COMMERCIAL

## 2023-03-02 VITALS
HEIGHT: 60 IN | HEART RATE: 94 BPM | DIASTOLIC BLOOD PRESSURE: 82 MMHG | BODY MASS INDEX: 37.11 KG/M2 | OXYGEN SATURATION: 97 % | RESPIRATION RATE: 16 BRPM | WEIGHT: 189 LBS | SYSTOLIC BLOOD PRESSURE: 118 MMHG

## 2023-03-02 DIAGNOSIS — E78.00 PURE HYPERCHOLESTEROLEMIA: ICD-10-CM

## 2023-03-02 DIAGNOSIS — Q60.5 CONGENITAL SMALL KIDNEY: ICD-10-CM

## 2023-03-02 DIAGNOSIS — N17.9 AKI (ACUTE KIDNEY INJURY) (HCC): ICD-10-CM

## 2023-03-02 DIAGNOSIS — I10 HYPERTENSION, UNSPECIFIED TYPE: ICD-10-CM

## 2023-03-02 DIAGNOSIS — E11.9 DIET-CONTROLLED DIABETES MELLITUS (HCC): Primary | ICD-10-CM

## 2023-03-02 PROCEDURE — 3017F COLORECTAL CA SCREEN DOC REV: CPT | Performed by: FAMILY MEDICINE

## 2023-03-02 PROCEDURE — 3044F HG A1C LEVEL LT 7.0%: CPT | Performed by: FAMILY MEDICINE

## 2023-03-02 PROCEDURE — 1036F TOBACCO NON-USER: CPT | Performed by: FAMILY MEDICINE

## 2023-03-02 PROCEDURE — 3074F SYST BP LT 130 MM HG: CPT | Performed by: FAMILY MEDICINE

## 2023-03-02 PROCEDURE — G8484 FLU IMMUNIZE NO ADMIN: HCPCS | Performed by: FAMILY MEDICINE

## 2023-03-02 PROCEDURE — G8427 DOCREV CUR MEDS BY ELIG CLIN: HCPCS | Performed by: FAMILY MEDICINE

## 2023-03-02 PROCEDURE — 2022F DILAT RTA XM EVC RTNOPTHY: CPT | Performed by: FAMILY MEDICINE

## 2023-03-02 PROCEDURE — 99214 OFFICE O/P EST MOD 30 MIN: CPT | Performed by: FAMILY MEDICINE

## 2023-03-02 PROCEDURE — 3079F DIAST BP 80-89 MM HG: CPT | Performed by: FAMILY MEDICINE

## 2023-03-02 PROCEDURE — G8417 CALC BMI ABV UP PARAM F/U: HCPCS | Performed by: FAMILY MEDICINE

## 2023-03-02 SDOH — ECONOMIC STABILITY: HOUSING INSECURITY
IN THE LAST 12 MONTHS, WAS THERE A TIME WHEN YOU DID NOT HAVE A STEADY PLACE TO SLEEP OR SLEPT IN A SHELTER (INCLUDING NOW)?: NO

## 2023-03-02 SDOH — ECONOMIC STABILITY: FOOD INSECURITY: WITHIN THE PAST 12 MONTHS, YOU WORRIED THAT YOUR FOOD WOULD RUN OUT BEFORE YOU GOT MONEY TO BUY MORE.: PATIENT DECLINED

## 2023-03-02 SDOH — ECONOMIC STABILITY: FOOD INSECURITY: WITHIN THE PAST 12 MONTHS, THE FOOD YOU BOUGHT JUST DIDN'T LAST AND YOU DIDN'T HAVE MONEY TO GET MORE.: PATIENT DECLINED

## 2023-03-02 SDOH — ECONOMIC STABILITY: INCOME INSECURITY: HOW HARD IS IT FOR YOU TO PAY FOR THE VERY BASICS LIKE FOOD, HOUSING, MEDICAL CARE, AND HEATING?: PATIENT DECLINED

## 2023-03-02 SDOH — ECONOMIC STABILITY: TRANSPORTATION INSECURITY
IN THE PAST 12 MONTHS, HAS LACK OF TRANSPORTATION KEPT YOU FROM MEETINGS, WORK, OR FROM GETTING THINGS NEEDED FOR DAILY LIVING?: NO

## 2023-03-02 ASSESSMENT — PATIENT HEALTH QUESTIONNAIRE - PHQ9
SUM OF ALL RESPONSES TO PHQ QUESTIONS 1-9: 0
1. LITTLE INTEREST OR PLEASURE IN DOING THINGS: 0
2. FEELING DOWN, DEPRESSED OR HOPELESS: 0
SUM OF ALL RESPONSES TO PHQ QUESTIONS 1-9: 0
SUM OF ALL RESPONSES TO PHQ QUESTIONS 1-9: 0
SUM OF ALL RESPONSES TO PHQ9 QUESTIONS 1 & 2: 0
SUM OF ALL RESPONSES TO PHQ QUESTIONS 1-9: 0

## 2023-03-02 ASSESSMENT — ENCOUNTER SYMPTOMS
RESPIRATORY NEGATIVE: 1
GASTROINTESTINAL NEGATIVE: 1

## 2023-03-02 NOTE — PROGRESS NOTES
3/2/2023    Rocío Noyola (:  1965) is a 62 y.o. female, here for a preventive medicine evaluation. Chief Complaint   Patient presents with    6 Month Follow-Up     DM     6 month eval.  Doing well. Sugars dropped slightly, cut out pop and sweet tea. Lab Results   Component Value Date    LABA1C 6.5 2023    LABA1C 6.8 2022    LABA1C 6.0 2019     Lab Results   Component Value Date    LABMICR 71.7 2023    LDLCALC 126 2023    CREATININE 1.03 2022     BP looks good. BP Readings from Last 3 Encounters:   23 118/82   09/10/22 (!) 145/77   22 (!) 142/82     Wt Readings from Last 3 Encounters:   23 189 lb (85.7 kg)   09/10/22 198 lb (89.8 kg)   22 198 lb 3.2 oz (89.9 kg)         Patient Active Problem List   Diagnosis    Congenital small kidney    Hypertension       Review of Systems   Constitutional: Negative. HENT: Negative. Respiratory: Negative. Cardiovascular: Negative. Gastrointestinal: Negative. Musculoskeletal: Negative. All other systems reviewed and are negative. Prior to Visit Medications    Medication Sig Taking?  Authorizing Provider   amLODIPine-benazepril (LOTREL) 5-10 MG per capsule Take 1 capsule by mouth daily Yes Imer Choi DO   FLUoxetine (PROZAC) 20 MG capsule Take 20 mg by mouth Take one tablet 14 days out of the month Yes Historical Provider, MD   Multiple Vitamins-Minerals (THERAPEUTIC MULTIVITAMIN-MINERALS) tablet Take 1 tablet by mouth daily  Patient not taking: Reported on 3/2/2023  Historical Provider, MD        Allergies   Allergen Reactions    Contrast [Iodides] Hives       Past Medical History:   Diagnosis Date    Congenital kidney disease     Hypertension        Past Surgical History:   Procedure Laterality Date    Margie John 50 & 2004    COLONOSCOPY  10/31/2017    COLONOSCOPY N/A 2021    COLONOSCOPY performed by Dwayne Malik MD at [facility]    NM COLON CA SCRN NOT HI RSK IND N/A 10/31/2017    COLONOSCOPY performed by Amy Perdomo MD at [address] [facility] FLX W/RMVL OF TUMOR POLYP LESION SNARE TQ  10/31/2017    COLONOSCOPY POLYPECTOMY SNARE/COLD BIOPSY performed by Amy Perdomo MD at [facility]       Social History     Socioeconomic History    Marital status:      Spouse name: Not on file    Number of children: Not on file    Years of education: Not on file    Highest education level: Not on file   Occupational History    Not on file   Tobacco Use    Smoking status: Never    Smokeless tobacco: Never   Substance and Sexual Activity    Alcohol use: No    Drug use: No    Sexual activity: Not on file   Other Topics Concern    Not on file   Social History Narrative    Not on file     Social Determinants of Health     Financial Resource Strain: Unknown    Difficulty of Paying Living Expenses: Patient refused   Food Insecurity: Unknown    Worried About [address] in the Last Year: Patient refused    [address] in the Last Year: Patient refused   Transportation Needs: Unknown    Lack of Transportation (Medical): Not on file    Lack of Transportation (Non-Medical):  No   Physical Activity: Not on file   Stress: Not on file   Social Connections: Not on file   Intimate Partner Violence: Not on file   Housing Stability: Unknown    Unable to Pay for Housing in the Last Year: Not on file    Number of Places Lived in the Last Year: Not on file    Unstable Housing in the Last Year: No        Family History   Problem Relation Age of Onset    Kidney Disease Mother     Other Mother     Diabetes Father     Kidney Disease Father     Breast Cancer Sister     Kidney Disease Sister     Other Sister     Other Brother     Heart Disease Maternal Grandmother     Colon Cancer Maternal Grandfather     High Cholesterol Paternal Grandmother     High Cholesterol Paternal Grandfather        ADVANCE DIRECTIVE: N, <no information>    Vitals: 03/02/23 1024   BP: 118/82   Site: Left Upper Arm   Position: Sitting   Cuff Size: Medium Adult   Pulse: 94   Resp: 16   SpO2: 97%  Comment: Room Air   Weight: 189 lb (85.7 kg)   Height: 5' (1.524 m)     Estimated body mass index is 36.91 kg/m² as calculated from the following:    Height as of this encounter: 5' (1.524 m). Weight as of this encounter: 189 lb (85.7 kg). Physical Exam  Vitals and nursing note reviewed. Constitutional:       General: She is not in acute distress. Appearance: Normal appearance. She is well-developed. HENT:      Head: Normocephalic and atraumatic. Right Ear: Tympanic membrane normal.      Left Ear: Tympanic membrane normal.   Eyes:      Conjunctiva/sclera: Conjunctivae normal.   Cardiovascular:      Rate and Rhythm: Normal rate and regular rhythm. Heart sounds: Normal heart sounds. No murmur heard. Pulmonary:      Effort: Pulmonary effort is normal.      Breath sounds: Normal breath sounds. No wheezing, rhonchi or rales. Abdominal:      General: There is no distension. Musculoskeletal:      Cervical back: Neck supple. Skin:     General: Skin is warm and dry. Findings: No rash (on exposed surfaces). Neurological:      General: No focal deficit present. Mental Status: She is alert. Psychiatric:         Attention and Perception: Attention normal.         Mood and Affect: Mood normal.         Speech: Speech normal.         Behavior: Behavior normal. Behavior is cooperative. Thought Content: Thought content normal.         Judgment: Judgment normal.       No flowsheet data found.     Lab Results   Component Value Date/Time    CHOL 187 02/28/2023 12:00 AM    CHOL 210 08/22/2022 12:00 AM    CHOL 215 03/08/2019 12:00 AM    TRIG 146 02/28/2023 12:00 AM    TRIG 200 08/22/2022 12:00 AM    TRIG 201 03/08/2019 12:00 AM    HDL 35 02/28/2023 12:00 AM    HDL 36 08/22/2022 12:00 AM    HDL 38 03/08/2019 12:00 AM    LDLCALC 126 02/28/2023 12:00 AM [address] 138 08/22/2022 12:00 AM    LDLCALC 137 03/08/2019 12:00 AM    GLUCOSE 149 08/22/2022 12:00 AM    LABA1C 6.5 02/28/2023 12:00 AM    LABA1C 6.8 08/22/2022 12:00 AM    LABA1C 6.0 03/08/2019 12:00 AM       The 10-year ASCVD risk score (Jacobo PIKE, et al., 2019) is: 7.2%    Values used to calculate the score:      Age: 62 years      Sex: Female      Is Non- : No      Diabetic: Yes      Tobacco smoker: No      Systolic Blood Pressure: 570 mmHg      Is BP treated: Yes      HDL Cholesterol: 35 mg/dL      Total Cholesterol: 187 mg/dL      There is no immunization history on file for this patient. Health Maintenance   Topic Date Due    COVID-19 Vaccine (1) Never done    Pneumococcal 0-64 years Vaccine (1 - PCV) Never done    HIV screen  Never done    Hepatitis C screen  Never done    DTaP/Tdap/Td vaccine (1 - Tdap) Never done    Shingles vaccine (1 of 2) Never done    Flu vaccine (1) Never done    A1C test (Diabetic or Prediabetic)  05/28/2023    Breast cancer screen  06/07/2023    Depression Screen  08/18/2023    Cervical cancer screen  08/15/2025    Lipids  02/28/2028    Colorectal Cancer Screen  11/16/2031    Hepatitis A vaccine  Aged Out    Hib vaccine  Aged Out    Meningococcal (ACWY) vaccine  Aged Out       Assessment & Plan   Diet-controlled diabetes mellitus ([facility] [address]  -     Hemoglobin A1C; Future  RICHELLE (acute kidney injury) ([facility] [address]  -     Basic Metabolic Panel;  Future  Hypertension, unspecified type  Congenital small kidney  Pure hypercholesterolemia    -  Chronic medical problems stable  -  Labs reviewed, ok overall  -  Slight bump in SCr, avoid NSAIDs  -  Continue current medications  -  Recheck labs 6 mos    Return in about 6 months (around 9/2/2023) for DM2.         --Cleave Hemp, DO

## 2023-07-22 ENCOUNTER — HOSPITAL ENCOUNTER (OUTPATIENT)
Age: 58
Setting detail: OBSERVATION
Discharge: HOME | End: 2023-07-25
Attending: EMERGENCY MEDICINE
Payer: COMMERCIAL

## 2023-07-22 ENCOUNTER — APPOINTMENT (OUTPATIENT)
Dept: CT IMAGING | Age: 58
End: 2023-07-22
Payer: COMMERCIAL

## 2023-07-22 DIAGNOSIS — N13.30 HYDRONEPHROSIS, UNSPECIFIED HYDRONEPHROSIS TYPE: ICD-10-CM

## 2023-07-22 DIAGNOSIS — N10 ACUTE PYELONEPHRITIS: Primary | ICD-10-CM

## 2023-07-22 PROBLEM — A41.9 SEPSIS (HCC): Status: ACTIVE | Noted: 2023-07-22

## 2023-07-22 LAB
BACTERIA URNS QL MICRO: ABNORMAL /HPF
BASOPHILS ABSOLUTE: 0.1 THOU/MM3 (ref 0–0.1)
BASOPHILS NFR BLD AUTO: 0.3 %
BILIRUB UR QL STRIP.AUTO: NEGATIVE
BILIRUB UR STRIP.AUTO-MCNC: NEGATIVE MG/DL
BUN BLD-MCNC: 9 MG/DL (ref 8–26)
CASTS #/AREA URNS LPF: ABNORMAL /LPF
CASTS 2: ABNORMAL /LPF
CHARACTER UR: CLEAR
CHARACTER UR: CLEAR
CHLORIDE BLD-SCNC: 98 MEQ/L (ref 98–109)
CO2 BLD CALC-SCNC: 25 MEQ/L (ref 23–33)
COLOR: YELLOW
COLOR: YELLOW
CREAT BLD-MCNC: 1.2 MG/DL (ref 0.5–1.2)
CRYSTALS URNS MICRO: ABNORMAL
EOSINOPHIL NFR BLD AUTO: 0.2 %
EOSINOPHILS ABSOLUTE: 0 THOU/MM3 (ref 0–0.4)
EPITHELIAL CELLS, UA: ABNORMAL /HPF
ERYTHROCYTE [DISTWIDTH] IN BLOOD BY AUTOMATED COUNT: 12.8 % (ref 11.5–14.5)
FERRITIN SERPL IA-MCNC: 306 NG/ML (ref 10–291)
GFR SERPL CREATININE-BSD FRML MDRD: 53 ML/MIN/1.73M2
GLUCOSE BLD-MCNC: 149 MG/DL (ref 70–108)
GLUCOSE UR QL STRIP.AUTO: NEGATIVE MG/DL
GLUCOSE UR QL STRIP.AUTO: NEGATIVE MG/DL
HCT VFR BLD AUTO: 34.9 % (ref 37–47)
HGB BLD-MCNC: 11.4 GM/DL (ref 12–16)
HGB RETIC QN AUTO: 30.6 PG (ref 28.2–35.7)
HGB UR QL STRIP.AUTO: ABNORMAL
IMM GRANULOCYTES # BLD AUTO: 0.24 THOU/MM3 (ref 0–0.07)
IMM GRANULOCYTES NFR BLD AUTO: 1.3 %
IMM RETICS NFR: 10.2 % (ref 3–15.9)
IRON SATN MFR SERPL: 7 % (ref 20–50)
IRON SERPL-MCNC: 15 UG/DL (ref 50–170)
KETONES UR QL STRIP.AUTO: NEGATIVE
KETONES UR QL STRIP.AUTO: NEGATIVE
LACTATE SERPL-SCNC: 1.4 MMOL/L (ref 0.5–2)
LYMPHOCYTES ABSOLUTE: 1.8 THOU/MM3 (ref 1–4.8)
LYMPHOCYTES NFR BLD AUTO: 9.6 %
MCH RBC QN AUTO: 29.2 PG (ref 27–31)
MCHC RBC AUTO-ENTMCNC: 32.7 GM/DL (ref 33–37)
MCV RBC AUTO: 90 FL (ref 81–99)
MISCELLANEOUS 2: ABNORMAL
MONOCYTES ABSOLUTE: 1.7 THOU/MM3 (ref 0.4–1.3)
MONOCYTES NFR BLD AUTO: 8.8 %
NEUTROPHILS NFR BLD AUTO: 79.8 %
NITRITE UR QL STRIP.AUTO: NEGATIVE
NITRITE UR QL STRIP: NEGATIVE
NRBC BLD AUTO-RTO: 0 /100 WBC
PH UR STRIP.AUTO: 6.5 [PH] (ref 5–9)
PH UR STRIP.AUTO: 7 [PH] (ref 5–9)
PLATELET # BLD AUTO: 459 THOU/MM3 (ref 130–400)
PMV BLD AUTO: 8.6 FL (ref 7.4–10.4)
POTASSIUM BLD-SCNC: 4.3 MEQ/L (ref 3.5–4.9)
PROT UR STRIP.AUTO-MCNC: 30 MG/DL
PROT UR STRIP.AUTO-MCNC: ABNORMAL MG/DL
RBC # BLD AUTO: 3.9 MILL/MM3 (ref 4.2–5.4)
RBC #/AREA URNS HPF: ABNORMAL /[HPF]
RBC URINE: ABNORMAL /HPF
RENAL EPI CELLS #/AREA URNS HPF: ABNORMAL /[HPF]
RETICS # AUTO: 48 THOU/MM3 (ref 20–115)
RETICS/RBC NFR AUTO: 1.4 % (ref 0.5–2)
SEGMENTED NEUTROPHILS ABSOLUTE COUNT: 15.2 THOU/MM3 (ref 1.8–7.7)
SODIUM BLD-SCNC: 134 MEQ/L (ref 138–146)
SP GR UR REFRACT.AUTO: 1 (ref 1–1.03)
SP GR UR STRIP.AUTO: 1.01 (ref 1–1.03)
TIBC SERPL-MCNC: 213 UG/DL (ref 171–450)
UROBILINOGEN, URINE: 0.2 EU/DL (ref 0.2–1)
UROBILINOGEN, URINE: 0.2 EU/DL (ref 0–1)
WBC # BLD AUTO: 19.1 THOU/MM3 (ref 4.8–10.8)
WBC #/AREA URNS HPF: ABNORMAL /HPF
WBC #/AREA URNS HPF: ABNORMAL /[HPF]
WBC #/AREA URNS HPF: ABNORMAL /[HPF]
YEAST LIKE FUNGI URNS QL MICRO: ABNORMAL

## 2023-07-22 PROCEDURE — 81003 URINALYSIS AUTO W/O SCOPE: CPT

## 2023-07-22 PROCEDURE — 96375 TX/PRO/DX INJ NEW DRUG ADDON: CPT

## 2023-07-22 PROCEDURE — 87186 SC STD MICRODIL/AGAR DIL: CPT

## 2023-07-22 PROCEDURE — 6370000000 HC RX 637 (ALT 250 FOR IP): Performed by: PHYSICIAN ASSISTANT

## 2023-07-22 PROCEDURE — 84520 ASSAY OF UREA NITROGEN: CPT

## 2023-07-22 PROCEDURE — 83605 ASSAY OF LACTIC ACID: CPT

## 2023-07-22 PROCEDURE — 2580000003 HC RX 258

## 2023-07-22 PROCEDURE — 6360000002 HC RX W HCPCS: Performed by: PHYSICIAN ASSISTANT

## 2023-07-22 PROCEDURE — 99223 1ST HOSP IP/OBS HIGH 75: CPT | Performed by: PHYSICIAN ASSISTANT

## 2023-07-22 PROCEDURE — 99214 OFFICE O/P EST MOD 30 MIN: CPT | Performed by: NURSE PRACTITIONER

## 2023-07-22 PROCEDURE — 87077 CULTURE AEROBIC IDENTIFY: CPT

## 2023-07-22 PROCEDURE — 96361 HYDRATE IV INFUSION ADD-ON: CPT

## 2023-07-22 PROCEDURE — 36415 COLL VENOUS BLD VENIPUNCTURE: CPT

## 2023-07-22 PROCEDURE — 96376 TX/PRO/DX INJ SAME DRUG ADON: CPT

## 2023-07-22 PROCEDURE — 82565 ASSAY OF CREATININE: CPT

## 2023-07-22 PROCEDURE — G0378 HOSPITAL OBSERVATION PER HR: HCPCS

## 2023-07-22 PROCEDURE — 81001 URINALYSIS AUTO W/SCOPE: CPT

## 2023-07-22 PROCEDURE — 82728 ASSAY OF FERRITIN: CPT

## 2023-07-22 PROCEDURE — 6360000002 HC RX W HCPCS

## 2023-07-22 PROCEDURE — 80051 ELECTROLYTE PANEL: CPT

## 2023-07-22 PROCEDURE — 83540 ASSAY OF IRON: CPT

## 2023-07-22 PROCEDURE — 2580000003 HC RX 258: Performed by: PHYSICIAN ASSISTANT

## 2023-07-22 PROCEDURE — 87086 URINE CULTURE/COLONY COUNT: CPT

## 2023-07-22 PROCEDURE — 74176 CT ABD & PELVIS W/O CONTRAST: CPT

## 2023-07-22 PROCEDURE — 85025 COMPLETE CBC W/AUTO DIFF WBC: CPT

## 2023-07-22 PROCEDURE — 99285 EMERGENCY DEPT VISIT HI MDM: CPT

## 2023-07-22 PROCEDURE — 99215 OFFICE O/P EST HI 40 MIN: CPT

## 2023-07-22 PROCEDURE — 87040 BLOOD CULTURE FOR BACTERIA: CPT

## 2023-07-22 PROCEDURE — 83550 IRON BINDING TEST: CPT

## 2023-07-22 PROCEDURE — 96365 THER/PROPH/DIAG IV INF INIT: CPT

## 2023-07-22 PROCEDURE — 85046 RETICYTE/HGB CONCENTRATE: CPT

## 2023-07-22 PROCEDURE — 82947 ASSAY GLUCOSE BLOOD QUANT: CPT

## 2023-07-22 RX ORDER — TAMSULOSIN HYDROCHLORIDE 0.4 MG/1
0.4 CAPSULE ORAL DAILY
Status: DISCONTINUED | OUTPATIENT
Start: 2023-07-22 | End: 2023-07-25 | Stop reason: HOSPADM

## 2023-07-22 RX ORDER — ACETAMINOPHEN 325 MG/1
650 TABLET ORAL EVERY 6 HOURS PRN
Status: DISCONTINUED | OUTPATIENT
Start: 2023-07-22 | End: 2023-07-25 | Stop reason: HOSPADM

## 2023-07-22 RX ORDER — ONDANSETRON 2 MG/ML
4 INJECTION INTRAMUSCULAR; INTRAVENOUS EVERY 6 HOURS PRN
Status: DISCONTINUED | OUTPATIENT
Start: 2023-07-22 | End: 2023-07-25 | Stop reason: HOSPADM

## 2023-07-22 RX ORDER — 0.9 % SODIUM CHLORIDE 0.9 %
1000 INTRAVENOUS SOLUTION INTRAVENOUS ONCE
Status: COMPLETED | OUTPATIENT
Start: 2023-07-22 | End: 2023-07-22

## 2023-07-22 RX ORDER — SODIUM CHLORIDE 0.9 % (FLUSH) 0.9 %
5-40 SYRINGE (ML) INJECTION PRN
Status: DISCONTINUED | OUTPATIENT
Start: 2023-07-22 | End: 2023-07-25 | Stop reason: HOSPADM

## 2023-07-22 RX ORDER — AMLODIPINE BESYLATE AND BENAZEPRIL HYDROCHLORIDE 5; 10 MG/1; MG/1
1 CAPSULE ORAL DAILY
Status: DISCONTINUED | OUTPATIENT
Start: 2023-07-22 | End: 2023-07-25 | Stop reason: HOSPADM

## 2023-07-22 RX ORDER — KETOROLAC TROMETHAMINE 30 MG/ML
15 INJECTION, SOLUTION INTRAMUSCULAR; INTRAVENOUS EVERY 6 HOURS PRN
Status: DISCONTINUED | OUTPATIENT
Start: 2023-07-22 | End: 2023-07-25 | Stop reason: HOSPADM

## 2023-07-22 RX ORDER — SODIUM CHLORIDE 0.9 % (FLUSH) 0.9 %
5-40 SYRINGE (ML) INJECTION EVERY 12 HOURS SCHEDULED
Status: DISCONTINUED | OUTPATIENT
Start: 2023-07-22 | End: 2023-07-25 | Stop reason: HOSPADM

## 2023-07-22 RX ORDER — POLYETHYLENE GLYCOL 3350 17 G/17G
17 POWDER, FOR SOLUTION ORAL DAILY PRN
Status: DISCONTINUED | OUTPATIENT
Start: 2023-07-22 | End: 2023-07-25 | Stop reason: HOSPADM

## 2023-07-22 RX ORDER — ONDANSETRON 4 MG/1
4 TABLET, ORALLY DISINTEGRATING ORAL EVERY 8 HOURS PRN
Status: DISCONTINUED | OUTPATIENT
Start: 2023-07-22 | End: 2023-07-25 | Stop reason: HOSPADM

## 2023-07-22 RX ORDER — SODIUM CHLORIDE 9 MG/ML
INJECTION, SOLUTION INTRAVENOUS CONTINUOUS
Status: DISCONTINUED | OUTPATIENT
Start: 2023-07-22 | End: 2023-07-25 | Stop reason: HOSPADM

## 2023-07-22 RX ORDER — ACETAMINOPHEN 650 MG/1
650 SUPPOSITORY RECTAL EVERY 6 HOURS PRN
Status: DISCONTINUED | OUTPATIENT
Start: 2023-07-22 | End: 2023-07-25 | Stop reason: HOSPADM

## 2023-07-22 RX ORDER — FLUOXETINE HYDROCHLORIDE 20 MG/1
20 CAPSULE ORAL NIGHTLY
Status: DISCONTINUED | OUTPATIENT
Start: 2023-07-22 | End: 2023-07-25 | Stop reason: HOSPADM

## 2023-07-22 RX ORDER — SODIUM CHLORIDE 9 MG/ML
INJECTION, SOLUTION INTRAVENOUS PRN
Status: DISCONTINUED | OUTPATIENT
Start: 2023-07-22 | End: 2023-07-25 | Stop reason: HOSPADM

## 2023-07-22 RX ADMIN — KETOROLAC TROMETHAMINE 15 MG: 30 INJECTION, SOLUTION INTRAMUSCULAR; INTRAVENOUS at 22:07

## 2023-07-22 RX ADMIN — SODIUM CHLORIDE 1000 ML: 9 INJECTION, SOLUTION INTRAVENOUS at 15:02

## 2023-07-22 RX ADMIN — KETOROLAC TROMETHAMINE 15 MG: 30 INJECTION, SOLUTION INTRAMUSCULAR; INTRAVENOUS at 15:55

## 2023-07-22 RX ADMIN — ACETAMINOPHEN 650 MG: 325 TABLET ORAL at 20:03

## 2023-07-22 RX ADMIN — TAMSULOSIN HYDROCHLORIDE 0.4 MG: 0.4 CAPSULE ORAL at 17:03

## 2023-07-22 RX ADMIN — AMLODIPINE BESYLATE AND BENAZEPRIL HYDROCHLORIDE 1 CAPSULE: 5; 10 CAPSULE ORAL at 22:08

## 2023-07-22 RX ADMIN — FLUOXETINE 20 MG: 20 CAPSULE ORAL at 22:07

## 2023-07-22 RX ADMIN — CEFTRIAXONE SODIUM 1000 MG: 1 INJECTION, POWDER, FOR SOLUTION INTRAMUSCULAR; INTRAVENOUS at 12:43

## 2023-07-22 RX ADMIN — SODIUM CHLORIDE: 9 INJECTION, SOLUTION INTRAVENOUS at 17:02

## 2023-07-22 ASSESSMENT — PAIN - FUNCTIONAL ASSESSMENT
PAIN_FUNCTIONAL_ASSESSMENT: PREVENTS OR INTERFERES SOME ACTIVE ACTIVITIES AND ADLS
PAIN_FUNCTIONAL_ASSESSMENT: 0-10
PAIN_FUNCTIONAL_ASSESSMENT: PREVENTS OR INTERFERES SOME ACTIVE ACTIVITIES AND ADLS

## 2023-07-22 ASSESSMENT — PAIN SCALES - GENERAL
PAINLEVEL_OUTOF10: 5
PAINLEVEL_OUTOF10: 5
PAINLEVEL_OUTOF10: 7
PAINLEVEL_OUTOF10: 5
PAINLEVEL_OUTOF10: 4
PAINLEVEL_OUTOF10: 2
PAINLEVEL_OUTOF10: 3
PAINLEVEL_OUTOF10: 2

## 2023-07-22 ASSESSMENT — ENCOUNTER SYMPTOMS
TROUBLE SWALLOWING: 0
VOMITING: 1
BACK PAIN: 0
NAUSEA: 0
ABDOMINAL PAIN: 0
SHORTNESS OF BREATH: 0

## 2023-07-22 ASSESSMENT — PAIN DESCRIPTION - LOCATION
LOCATION: BACK
LOCATION: FLANK
LOCATION: ABDOMEN;FLANK
LOCATION: ABDOMEN;FLANK
LOCATION: BACK

## 2023-07-22 ASSESSMENT — PAIN DESCRIPTION - FREQUENCY: FREQUENCY: CONTINUOUS

## 2023-07-22 ASSESSMENT — PAIN DESCRIPTION - ORIENTATION
ORIENTATION: RIGHT
ORIENTATION: LOWER
ORIENTATION: RIGHT
ORIENTATION: RIGHT;LOWER
ORIENTATION: RIGHT

## 2023-07-22 ASSESSMENT — PAIN DESCRIPTION - DESCRIPTORS
DESCRIPTORS: ACHING;THROBBING;SHARP
DESCRIPTORS: ACHING
DESCRIPTORS: ACHING;DISCOMFORT
DESCRIPTORS: BURNING;THROBBING

## 2023-07-22 ASSESSMENT — PAIN DESCRIPTION - PAIN TYPE: TYPE: ACUTE PAIN

## 2023-07-22 ASSESSMENT — PAIN DESCRIPTION - ONSET: ONSET: ON-GOING

## 2023-07-22 NOTE — DISCHARGE INSTRUCTIONS
Proceed immediately to [facility] emergency room for further evaluation and treatment. Do not eat or drink in route to the hospital.    ED Course: Patient diagnosed with urosepsis probably secondary to obstructive nephrolithiasis. Urology consulted and patient admitted to medicine. Discharge instructions: Cystoscopy stent placement  You May experience painful urination and see blood in the urine after your procedure. This should resolve over time. Pt ok to discharge home in good condition  No heavy lifting, >10 lbs for today  Pt should avoid strenuous activity for today  Pt should walk moderately at home  Pt ok to shower   Pt may resume diet as tolerated  Please call attending physician or hospital  with questions  Call or Present to ED if fever (> 101F), intractable nausea vomiting or pain.   Rx in chart     Pt should follow up with Dr. Zabrina Prieto MD , in 1-2 weeks, for definitive stone therapy, call to confirm appointment

## 2023-07-22 NOTE — H&P
Hospitalist History & Physical    Patient:  Heather Jacinto    Unit/Bed:D/D  YOB: 1965  MRN: [id number]   Acct: [de-identified]   PCP: Destiny Small DO  Code Status: Full Code    Date of Service: Pt seen/examined on 07/22/23 and admitted to Observation with expected LOS less than two midnights due to medical therapy. Chief Complaint: flank pain, fevers    Assessment/Plan:    Sepsis  Identified at 1037  Secondary to pyelonephritis  SIRS Criteria Heart Rate >90 and WBC >12k or <4k or >10% Bands  Organ Dysfunction: None  Cultures Blood x2 and Urine  Antibiotics Ceftriaxone  Volume Expansion: 1L NS. No indication for 30cc/kg. Severe right sided hydronephrosis  Secondary to apparent ureteropelvic junction stone  Start Flomax  IV fluid resuscitation. Urology consult. N.p.o. at this time. Acute pyelonephritis  Continue Rocephin. Await urine culture results. Normocytic anemia  Hemoglobin 11.4. Check anemia profile. Thrombocytosis  Platelets 906. Likely reactive. Repeat CBC tomorrow. Hypertension  Continue home medication with hold parameters  PMDD  Continue Prozac      History of Present Illness:  Heather Jacinto is a 62 y.o. female with a history of PMDD and hypertension who presented to [facility] with chief complaint of back pain and fevers. The patient states approximately 1 week ago she began having fevers. There was also associated back pain at that time. This resolved after approximately 3 to 4 days. However, her back pain did continue. She was also having urinary urgency and frequency at that time. Her fevers recurred yesterday and she is continued to have back pain so she came to the emergency department for further evaluation. In the emergency department, she was found to have imaging findings concerning for right-sided pyelonephritis as well as right-sided hydronephrosis due to an apparent ureteropelvic junction stone. She denies any history of kidney stones.   She does

## 2023-07-22 NOTE — PLAN OF CARE
Problem: Pain  Goal: Verbalizes/displays adequate comfort level or baseline comfort level  Outcome: Progressing   Pain Assessment: 0-10  Pain Level: 2   Patient's Stated Pain Goal: 0 - No pain   Is pain goal met at this time? No     Non-Pharmaceutical Pain Intervention(s): Heat applied  PRN medications per MAR. Problem: Safety - Adult  Goal: Free from fall injury  Outcome: Progressing   Fall assessment completed. Patient using call light appropriately to call for assistance with ambulation to bathroom. Personal items within reach. Patient is also compliant with use of non-skid slippers. Care plan reviewed with patient and . Patient and  verbalize understanding of the plan of care and contribute to goal setting.

## 2023-07-22 NOTE — ED NOTES
Pt transported to [facility] by cart in stable condition. Called [facility] and informed Herbert Bosch that the patient was on their way to the unit.       Joseph Hatfield, LPN  77/[id number]/[id number]

## 2023-07-22 NOTE — ED PROVIDER NOTES
[facility] DEPT  EMERGENCY DEPARTMENT ENCOUNTER          Pt Name: Deborah Lowery  MRN: [id number]  [address] 1965  Date of evaluation: 7/22/2023  Physician: Krystal Ceja MD  Supervising Attending Physician: Karen Alexander, [address] [place] [address]       Chief Complaint   Patient presents with    Dysuria         HISTORY OF PRESENT ILLNESS    HPI  Deborah Lowery is a 62 y.o. female who presents to the emergency department from  urgent care  for evaluation of possible sepsis secondary to abnormal urinalysis and leukocytosis. Patient has had a week of subjective fever, back pain, right flank pain, and vomiting. Patient denies any shortness of breath, chest pain, neck pain, headache. Fever ameliorated by extra strength Tylenol. Patient tolerating p.o. at the moment. The patient has no other acute complaints at this time. PAST MEDICAL AND SURGICAL HISTORY     Past Medical History:   Diagnosis Date    Congenital kidney disease     Hypertension      Past Surgical History:   Procedure Laterality Date    [address] & 2004    COLONOSCOPY  10/31/2017    COLONOSCOPY N/A 11/16/2021    COLONOSCOPY performed by Lillian Valdez MD at [address] [place] [id number] [facility] IND N/A 10/31/2017    COLONOSCOPY performed by Lillian Valdez MD at [facility] W/RMVL OF TUMOR POLYP LESION [facility]  10/31/2017    COLONOSCOPY POLYPECTOMY SNARE/COLD BIOPSY performed by Lillian Valdez MD at [address] [place] [address]   No current facility-administered medications for this encounter.     Current Outpatient Medications:     amLODIPine-benazepril (LOTREL) 5-10 MG per capsule, Take 1 capsule by mouth daily, Disp: 90 capsule, Rfl: 3    FLUoxetine (PROZAC) 20 MG capsule, Take 20 mg by mouth Take one tablet 14 days out of the month, Disp: , Rfl:     Multiple Vitamins-Minerals (THERAPEUTIC MULTIVITAMIN-MINERALS) tablet, Take 1 tablet

## 2023-07-22 NOTE — ED NOTES
Here with  and will travel by car to [facility] ED     Salma Garcia, SRINI  07/22/23 1007

## 2023-07-22 NOTE — ED TRIAGE NOTES
Started last week-end with low back pain, nausea/vomiting, fever, by Monday was a little better(with azo,tylenol) and then Friday again with fever,back pain, frequency of urination

## 2023-07-22 NOTE — ED PROVIDER NOTES
[address]  Urgent Care Encounter      CHIEF COMPLAINT       Chief Complaint   Patient presents with    Dysuria       Nurses Notes reviewed and I agree except as noted in the HPI. HISTORY OF PRESENT ILLNESS   Shant Davidson is a 62 y.o. female who presents 8-day history of burning with urination, increased frequency, vomiting, and fever. Patient has vomited approximately 4 times in the last 2 days. Patient states she does not know how high her fever is that she has not been measuring it but she has felt like she has been running a temperature. Appetite has been decreased and she has had increased fatigue. Patient states she took Azo and increase her water intake several days ago and started feeling better but symptoms returned and are worse now. Present pain level is 5/10. Patient does have a right congenital small kidney. REVIEW OF SYSTEMS     Review of Systems   Constitutional:  Positive for fever. HENT:  Negative for trouble swallowing. Respiratory:  Negative for shortness of breath. Cardiovascular:  Negative for chest pain. Gastrointestinal:  Positive for vomiting. Negative for abdominal pain and nausea. Genitourinary:  Positive for dysuria and frequency. Negative for decreased urine volume, difficulty urinating, flank pain, genital sores, hematuria, menstrual problem, pelvic pain, urgency, vaginal bleeding, vaginal discharge and vaginal pain. Musculoskeletal:  Negative for back pain, neck pain and neck stiffness. Skin:  Negative for rash. Neurological:  Negative for headaches. Hematological:  Negative for adenopathy. Psychiatric/Behavioral:  Negative for sleep disturbance. PAST MEDICAL HISTORY         Diagnosis Date    Congenital kidney disease     Hypertension        SURGICAL HISTORY     Patient  has a past surgical history that includes  section ( & );  Colonoscopy (10/31/2017); pr colon ca scrn not hi rsk ind (N/A, 10/31/2017); pr

## 2023-07-23 ENCOUNTER — ANESTHESIA EVENT (OUTPATIENT)
Dept: ONCOLOGY | Age: 58
End: 2023-07-23
Payer: COMMERCIAL

## 2023-07-23 ENCOUNTER — ANESTHESIA (OUTPATIENT)
Dept: ONCOLOGY | Age: 58
End: 2023-07-23
Payer: COMMERCIAL

## 2023-07-23 LAB
ANION GAP SERPL CALC-SCNC: 11 MEQ/L (ref 8–16)
BASOPHILS ABSOLUTE: 0 THOU/MM3 (ref 0–0.1)
BASOPHILS NFR BLD AUTO: 0.3 %
BUN SERPL-MCNC: 12 MG/DL (ref 7–22)
CA-I BLD ISE-SCNC: 1.07 MMOL/L (ref 1.12–1.32)
CALCIUM SERPL-MCNC: 8.1 MG/DL (ref 8.5–10.5)
CHLORIDE SERPL-SCNC: 105 MEQ/L (ref 98–111)
CO2 SERPL-SCNC: 23 MEQ/L (ref 23–33)
CREAT SERPL-MCNC: 1.1 MG/DL (ref 0.4–1.2)
DEPRECATED RDW RBC AUTO: 43.7 FL (ref 35–45)
EOSINOPHIL NFR BLD AUTO: 0.5 %
EOSINOPHILS ABSOLUTE: 0.1 THOU/MM3 (ref 0–0.4)
ERYTHROCYTE [DISTWIDTH] IN BLOOD BY AUTOMATED COUNT: 12.8 % (ref 11.5–14.5)
FOLATE SERPL-MCNC: 4.8 NG/ML (ref 4.8–24.2)
GFR SERPL CREATININE-BSD FRML MDRD: 58 ML/MIN/1.73M2
GLUCOSE SERPL-MCNC: 150 MG/DL (ref 70–108)
HCT VFR BLD AUTO: 32.8 % (ref 37–47)
HGB BLD-MCNC: 10.2 GM/DL (ref 12–16)
IMM GRANULOCYTES # BLD AUTO: 0.14 THOU/MM3 (ref 0–0.07)
IMM GRANULOCYTES NFR BLD AUTO: 1.1 %
LYMPHOCYTES ABSOLUTE: 1.2 THOU/MM3 (ref 1–4.8)
LYMPHOCYTES NFR BLD AUTO: 9.2 %
MCH RBC QN AUTO: 29 PG (ref 26–33)
MCHC RBC AUTO-ENTMCNC: 31.1 GM/DL (ref 32.2–35.5)
MCV RBC AUTO: 93.2 FL (ref 81–99)
MONOCYTES ABSOLUTE: 1.2 THOU/MM3 (ref 0.4–1.3)
MONOCYTES NFR BLD AUTO: 9.3 %
NEUTROPHILS NFR BLD AUTO: 79.6 %
NRBC BLD AUTO-RTO: 0 /100 WBC
PLATELET # BLD AUTO: 370 THOU/MM3 (ref 130–400)
PMV BLD AUTO: 8.8 FL (ref 9.4–12.4)
POTASSIUM SERPL-SCNC: 4 MEQ/L (ref 3.5–5.2)
RBC # BLD AUTO: 3.52 MILL/MM3 (ref 4.2–5.4)
SEGMENTED NEUTROPHILS ABSOLUTE COUNT: 10.5 THOU/MM3 (ref 1.8–7.7)
SODIUM SERPL-SCNC: 139 MEQ/L (ref 135–145)
VIT B12 SERPL-MCNC: 276 PG/ML (ref 211–911)
WBC # BLD AUTO: 13.2 THOU/MM3 (ref 4.8–10.8)

## 2023-07-23 PROCEDURE — 6360000002 HC RX W HCPCS: Performed by: UROLOGY

## 2023-07-23 PROCEDURE — 82746 ASSAY OF FOLIC ACID SERUM: CPT

## 2023-07-23 PROCEDURE — 82330 ASSAY OF CALCIUM: CPT

## 2023-07-23 PROCEDURE — 96361 HYDRATE IV INFUSION ADD-ON: CPT

## 2023-07-23 PROCEDURE — 82607 VITAMIN B-12: CPT

## 2023-07-23 PROCEDURE — 80048 BASIC METABOLIC PNL TOTAL CA: CPT

## 2023-07-23 PROCEDURE — G0378 HOSPITAL OBSERVATION PER HR: HCPCS

## 2023-07-23 PROCEDURE — 96367 TX/PROPH/DG ADDL SEQ IV INF: CPT

## 2023-07-23 PROCEDURE — 2580000003 HC RX 258: Performed by: PHYSICIAN ASSISTANT

## 2023-07-23 PROCEDURE — 36415 COLL VENOUS BLD VENIPUNCTURE: CPT

## 2023-07-23 PROCEDURE — 6370000000 HC RX 637 (ALT 250 FOR IP): Performed by: PHYSICIAN ASSISTANT

## 2023-07-23 PROCEDURE — 6360000002 HC RX W HCPCS: Performed by: PHYSICIAN ASSISTANT

## 2023-07-23 PROCEDURE — 99233 SBSQ HOSP IP/OBS HIGH 50: CPT | Performed by: INTERNAL MEDICINE

## 2023-07-23 PROCEDURE — 96366 THER/PROPH/DIAG IV INF ADDON: CPT

## 2023-07-23 PROCEDURE — 96376 TX/PRO/DX INJ SAME DRUG ADON: CPT

## 2023-07-23 PROCEDURE — 96375 TX/PRO/DX INJ NEW DRUG ADDON: CPT

## 2023-07-23 PROCEDURE — 85025 COMPLETE CBC W/AUTO DIFF WBC: CPT

## 2023-07-23 PROCEDURE — 6360000002 HC RX W HCPCS: Performed by: INTERNAL MEDICINE

## 2023-07-23 RX ORDER — MORPHINE SULFATE 2 MG/ML
2 INJECTION, SOLUTION INTRAMUSCULAR; INTRAVENOUS
Status: DISCONTINUED | OUTPATIENT
Start: 2023-07-23 | End: 2023-07-25 | Stop reason: HOSPADM

## 2023-07-23 RX ORDER — MORPHINE SULFATE 4 MG/ML
4 INJECTION, SOLUTION INTRAMUSCULAR; INTRAVENOUS
Status: DISCONTINUED | OUTPATIENT
Start: 2023-07-23 | End: 2023-07-25 | Stop reason: HOSPADM

## 2023-07-23 RX ORDER — CALCIUM GLUCONATE 20 MG/ML
2000 INJECTION, SOLUTION INTRAVENOUS ONCE
Status: COMPLETED | OUTPATIENT
Start: 2023-07-23 | End: 2023-07-23

## 2023-07-23 RX ADMIN — AMLODIPINE BESYLATE AND BENAZEPRIL HYDROCHLORIDE 1 CAPSULE: 5; 10 CAPSULE ORAL at 20:48

## 2023-07-23 RX ADMIN — MORPHINE SULFATE 2 MG: 2 INJECTION, SOLUTION INTRAMUSCULAR; INTRAVENOUS at 15:30

## 2023-07-23 RX ADMIN — CALCIUM GLUCONATE 2000 MG: 20 INJECTION, SOLUTION INTRAVENOUS at 08:55

## 2023-07-23 RX ADMIN — KETOROLAC TROMETHAMINE 15 MG: 30 INJECTION, SOLUTION INTRAMUSCULAR; INTRAVENOUS at 04:33

## 2023-07-23 RX ADMIN — FLUOXETINE 20 MG: 20 CAPSULE ORAL at 20:48

## 2023-07-23 RX ADMIN — SODIUM CHLORIDE: 9 INJECTION, SOLUTION INTRAVENOUS at 16:33

## 2023-07-23 RX ADMIN — SODIUM CHLORIDE: 9 INJECTION, SOLUTION INTRAVENOUS at 03:05

## 2023-07-23 RX ADMIN — KETOROLAC TROMETHAMINE 15 MG: 30 INJECTION, SOLUTION INTRAMUSCULAR; INTRAVENOUS at 18:40

## 2023-07-23 RX ADMIN — ACETAMINOPHEN 650 MG: 325 TABLET ORAL at 09:04

## 2023-07-23 RX ADMIN — CEFTRIAXONE SODIUM 1000 MG: 1 INJECTION, POWDER, FOR SOLUTION INTRAMUSCULAR; INTRAVENOUS at 12:49

## 2023-07-23 RX ADMIN — KETOROLAC TROMETHAMINE 15 MG: 30 INJECTION, SOLUTION INTRAMUSCULAR; INTRAVENOUS at 10:30

## 2023-07-23 RX ADMIN — TAMSULOSIN HYDROCHLORIDE 0.4 MG: 0.4 CAPSULE ORAL at 10:31

## 2023-07-23 ASSESSMENT — PAIN SCALES - GENERAL
PAINLEVEL_OUTOF10: 7
PAINLEVEL_OUTOF10: 2
PAINLEVEL_OUTOF10: 7
PAINLEVEL_OUTOF10: 5
PAINLEVEL_OUTOF10: 6
PAINLEVEL_OUTOF10: 7
PAINLEVEL_OUTOF10: 5
PAINLEVEL_OUTOF10: 7
PAINLEVEL_OUTOF10: 5
PAINLEVEL_OUTOF10: 6
PAINLEVEL_OUTOF10: 8

## 2023-07-23 ASSESSMENT — PAIN DESCRIPTION - ONSET
ONSET: ON-GOING
ONSET: ON-GOING

## 2023-07-23 ASSESSMENT — PAIN DESCRIPTION - LOCATION
LOCATION: FLANK
LOCATION: ABDOMEN;FLANK

## 2023-07-23 ASSESSMENT — PAIN DESCRIPTION - ORIENTATION
ORIENTATION: RIGHT

## 2023-07-23 ASSESSMENT — PAIN DESCRIPTION - DESCRIPTORS
DESCRIPTORS: ACHING
DESCRIPTORS: ACHING;DISCOMFORT
DESCRIPTORS: ACHING;DISCOMFORT
DESCRIPTORS: ACHING
DESCRIPTORS: ACHING;DISCOMFORT
DESCRIPTORS: ACHING;THROBBING
DESCRIPTORS: ACHING;DISCOMFORT

## 2023-07-23 ASSESSMENT — PAIN DESCRIPTION - PAIN TYPE
TYPE: ACUTE PAIN
TYPE: ACUTE PAIN

## 2023-07-23 ASSESSMENT — PAIN DESCRIPTION - FREQUENCY
FREQUENCY: CONTINUOUS
FREQUENCY: CONTINUOUS

## 2023-07-23 NOTE — CONSULTS
treatment    Thank you for including us in the care of [address] [place] [address], TIFF - CNP, TIFF  07/23/23 9:29 AM  Urology

## 2023-07-23 NOTE — PLAN OF CARE
Problem: Pain  Goal: Verbalizes/displays adequate comfort level or baseline comfort level  Outcome: Progressing   Pain Assessment: 0-10  Pain Level: 6   Patient's Stated Pain Goal: 2   Is pain goal met at this time? No     Non-Pharmaceutical Pain Intervention(s): Rest    Problem: Safety - Adult  Goal: Free from fall injury  7/23/2023 1027 by Charlie Goncalves RN  Outcome: Progressing   Fall assessment completed. Patient using call light appropriately to call for assistance with ambulation to bathroom. Personal items within reach. Patient is also compliant with use of non-skid slippers. Problem: Genitourinary - Adult  Goal: Absence of urinary retention  7/23/2023 1027 by Charlie Goncalves RN  Outcome: Progressing     Problem: Infection - Adult  Goal: Absence of infection at discharge  7/23/2023 1027 by Charlie Goncalves RN  Outcome: Progressing  Flowsheets (Taken 7/23/2023 0756)  Absence of infection at discharge:   Assess and monitor for signs and symptoms of infection   Monitor lab/diagnostic results     Care plan reviewed with patient and family. Patient and family verbalize understanding of the plan of care and contribute to goal setting.

## 2023-07-23 NOTE — PLAN OF CARE
Problem: Pain  Goal: Verbalizes/displays adequate comfort level or baseline comfort level  7/22/2023 1517 by Hunter Ball RN  Outcome: Progressing     Problem: Safety - Adult  Goal: Free from fall injury  7/22/2023 2318 by Kristina Ng RN  Outcome: Progressing  7/22/2023 1517 by Hunter Ball RN  Outcome: Progressing     Problem: Genitourinary - Adult  Goal: Absence of urinary retention  Outcome: Progressing     Problem: Infection - Adult  Goal: Absence of infection at discharge  Outcome: Progressing   Care plan reviewed with patient. Patient verbalizes understanding of the plan of care and contributes to goal setting.

## 2023-07-23 NOTE — CARE COORDINATION
7/23/23, 1:52 PM EDT      DISCHARGE PLANNING EVALUATION    Shant Atkins  Admitted: 7/22/2023  Hospital Day: 0    Location: 5-15/015-A Reason for admit: Acute pyelonephritis [N10]  Sepsis ([address]) [A41.9]  Hydronephrosis, unspecified hydronephrosis type [N13.30]    Past Medical History:   Diagnosis Date    Congenital kidney disease     Hypertension        Procedure: planning   7/22 CT abdomen pelvis  1. Severe hydronephrosis of the right kidney. There is associated perinephric fat stranding. There is a 5 mm calcification at the expected location of the ureteropelvic junction which can represent an obstructing stone. Post obstructive pyelonephritis is a consideration. 2. Cholelithiasis. 3. Nonobstructing left renal calculus. 7/23 Cystoscopy, right ureteral stent placement planned  Barriers to Discharge: NPO for surgery, consent for OR, IVF. PCP: Suha Dean,     Readmission Risk Low 0-14, Mod 15-19), High > 20: No data recorded    Advance Directives:      Code Status: Full Code   Patient's Primary Decision Maker is: Legal Next of [facility]  , has living will. Pt encouraged to bring in a copy. Patient Goals/Plan/Treatment Preferences: Met with Shant and her family; pt lives home with her . She was independent pta, drives, has PCP, and insurance verified. She declined HH or needs. Will follow post op. Transportation/Food Security/Housekeeping Addressed: No issues identified.

## 2023-07-24 ENCOUNTER — ANESTHESIA (OUTPATIENT)
Dept: OPERATING ROOM | Age: 58
End: 2023-07-24
Payer: COMMERCIAL

## 2023-07-24 ENCOUNTER — ANESTHESIA EVENT (OUTPATIENT)
Dept: OPERATING ROOM | Age: 58
End: 2023-07-24
Payer: COMMERCIAL

## 2023-07-24 PROBLEM — N39.0 SEPSIS DUE TO URINARY TRACT INFECTION (HCC): Status: ACTIVE | Noted: 2023-07-22

## 2023-07-24 LAB
ANION GAP SERPL CALC-SCNC: 14 MEQ/L (ref 8–16)
BACTERIA UR CULT: ABNORMAL
BASOPHILS ABSOLUTE: 0 THOU/MM3 (ref 0–0.1)
BASOPHILS NFR BLD AUTO: 0.3 %
BUN SERPL-MCNC: 10 MG/DL (ref 7–22)
CALCIUM SERPL-MCNC: 8.1 MG/DL (ref 8.5–10.5)
CHLORIDE SERPL-SCNC: 106 MEQ/L (ref 98–111)
CO2 SERPL-SCNC: 22 MEQ/L (ref 23–33)
CREAT SERPL-MCNC: 1.1 MG/DL (ref 0.4–1.2)
DEPRECATED RDW RBC AUTO: 44.6 FL (ref 35–45)
EOSINOPHIL NFR BLD AUTO: 1.7 %
EOSINOPHILS ABSOLUTE: 0.2 THOU/MM3 (ref 0–0.4)
ERYTHROCYTE [DISTWIDTH] IN BLOOD BY AUTOMATED COUNT: 12.8 % (ref 11.5–14.5)
GFR SERPL CREATININE-BSD FRML MDRD: 58 ML/MIN/1.73M2
GLUCOSE SERPL-MCNC: 102 MG/DL (ref 70–108)
HCT VFR BLD AUTO: 31.5 % (ref 37–47)
HGB BLD-MCNC: 9.6 GM/DL (ref 12–16)
IMM GRANULOCYTES # BLD AUTO: 0.08 THOU/MM3 (ref 0–0.07)
IMM GRANULOCYTES NFR BLD AUTO: 0.8 %
LYMPHOCYTES ABSOLUTE: 1.7 THOU/MM3 (ref 1–4.8)
LYMPHOCYTES NFR BLD AUTO: 17.3 %
MCH RBC QN AUTO: 28.7 PG (ref 26–33)
MCHC RBC AUTO-ENTMCNC: 30.5 GM/DL (ref 32.2–35.5)
MCV RBC AUTO: 94.3 FL (ref 81–99)
MONOCYTES ABSOLUTE: 0.8 THOU/MM3 (ref 0.4–1.3)
MONOCYTES NFR BLD AUTO: 8.3 %
NEUTROPHILS NFR BLD AUTO: 71.6 %
NRBC BLD AUTO-RTO: 0 /100 WBC
ORGANISM: ABNORMAL
PLATELET # BLD AUTO: 364 THOU/MM3 (ref 130–400)
PMV BLD AUTO: 8.7 FL (ref 9.4–12.4)
POTASSIUM SERPL-SCNC: 4.2 MEQ/L (ref 3.5–5.2)
RBC # BLD AUTO: 3.34 MILL/MM3 (ref 4.2–5.4)
SEGMENTED NEUTROPHILS ABSOLUTE COUNT: 6.9 THOU/MM3 (ref 1.8–7.7)
SODIUM SERPL-SCNC: 142 MEQ/L (ref 135–145)
WBC # BLD AUTO: 9.6 THOU/MM3 (ref 4.8–10.8)

## 2023-07-24 PROCEDURE — 3600000012 HC SURGERY LEVEL 2 ADDTL 15MIN: Performed by: UROLOGY

## 2023-07-24 PROCEDURE — 36415 COLL VENOUS BLD VENIPUNCTURE: CPT

## 2023-07-24 PROCEDURE — 99233 SBSQ HOSP IP/OBS HIGH 50: CPT | Performed by: INTERNAL MEDICINE

## 2023-07-24 PROCEDURE — 96376 TX/PRO/DX INJ SAME DRUG ADON: CPT

## 2023-07-24 PROCEDURE — 6370000000 HC RX 637 (ALT 250 FOR IP): Performed by: PHYSICIAN ASSISTANT

## 2023-07-24 PROCEDURE — 3600000002 HC SURGERY LEVEL 2 BASE: Performed by: UROLOGY

## 2023-07-24 PROCEDURE — G0378 HOSPITAL OBSERVATION PER HR: HCPCS

## 2023-07-24 PROCEDURE — 3700000000 HC ANESTHESIA ATTENDED CARE: Performed by: UROLOGY

## 2023-07-24 PROCEDURE — 6360000002 HC RX W HCPCS: Performed by: NURSE ANESTHETIST, CERTIFIED REGISTERED

## 2023-07-24 PROCEDURE — 3700000001 HC ADD 15 MINUTES (ANESTHESIA): Performed by: UROLOGY

## 2023-07-24 PROCEDURE — C1769 GUIDE WIRE: HCPCS | Performed by: UROLOGY

## 2023-07-24 PROCEDURE — C2617 STENT, NON-COR, TEM W/O DEL: HCPCS | Performed by: UROLOGY

## 2023-07-24 PROCEDURE — 99231 SBSQ HOSP IP/OBS SF/LOW 25: CPT | Performed by: UROLOGY

## 2023-07-24 PROCEDURE — 2580000003 HC RX 258: Performed by: PHYSICIAN ASSISTANT

## 2023-07-24 PROCEDURE — 96361 HYDRATE IV INFUSION ADD-ON: CPT

## 2023-07-24 PROCEDURE — 80048 BASIC METABOLIC PNL TOTAL CA: CPT

## 2023-07-24 PROCEDURE — 2709999900 HC NON-CHARGEABLE SUPPLY: Performed by: UROLOGY

## 2023-07-24 PROCEDURE — 2500000003 HC RX 250 WO HCPCS: Performed by: NURSE ANESTHETIST, CERTIFIED REGISTERED

## 2023-07-24 PROCEDURE — 85025 COMPLETE CBC W/AUTO DIFF WBC: CPT

## 2023-07-24 PROCEDURE — 6360000002 HC RX W HCPCS: Performed by: PHYSICIAN ASSISTANT

## 2023-07-24 DEVICE — URETERAL STENT
Type: IMPLANTABLE DEVICE | Status: FUNCTIONAL
Brand: PERCUFLEX™ PLUS

## 2023-07-24 RX ORDER — DEXAMETHASONE SODIUM PHOSPHATE 4 MG/ML
INJECTION, SOLUTION INTRA-ARTICULAR; INTRALESIONAL; INTRAMUSCULAR; INTRAVENOUS; SOFT TISSUE PRN
Status: DISCONTINUED | OUTPATIENT
Start: 2023-07-24 | End: 2023-07-24 | Stop reason: SDUPTHER

## 2023-07-24 RX ORDER — PHENAZOPYRIDINE HYDROCHLORIDE 100 MG/1
100 TABLET, FILM COATED ORAL 3 TIMES DAILY PRN
Qty: 9 TABLET | Refills: 0 | Status: SHIPPED | OUTPATIENT
Start: 2023-07-24 | End: 2023-07-27

## 2023-07-24 RX ORDER — FENTANYL CITRATE 50 UG/ML
INJECTION, SOLUTION INTRAMUSCULAR; INTRAVENOUS PRN
Status: DISCONTINUED | OUTPATIENT
Start: 2023-07-24 | End: 2023-07-24 | Stop reason: SDUPTHER

## 2023-07-24 RX ORDER — LIDOCAINE HYDROCHLORIDE 20 MG/ML
INJECTION, SOLUTION EPIDURAL; INFILTRATION; INTRACAUDAL; PERINEURAL PRN
Status: DISCONTINUED | OUTPATIENT
Start: 2023-07-24 | End: 2023-07-24 | Stop reason: SDUPTHER

## 2023-07-24 RX ORDER — PROPOFOL 10 MG/ML
INJECTION, EMULSION INTRAVENOUS PRN
Status: DISCONTINUED | OUTPATIENT
Start: 2023-07-24 | End: 2023-07-24 | Stop reason: SDUPTHER

## 2023-07-24 RX ORDER — OXYBUTYNIN CHLORIDE 5 MG/1
5 TABLET, EXTENDED RELEASE ORAL DAILY
Qty: 7 TABLET | Refills: 0 | Status: SHIPPED | OUTPATIENT
Start: 2023-07-24 | End: 2023-07-31

## 2023-07-24 RX ORDER — KETOROLAC TROMETHAMINE 10 MG/1
10 TABLET, FILM COATED ORAL EVERY 6 HOURS PRN
Qty: 15 TABLET | Refills: 0 | Status: SHIPPED | OUTPATIENT
Start: 2023-07-24

## 2023-07-24 RX ADMIN — ONDANSETRON 4 MG: 4 TABLET, ORALLY DISINTEGRATING ORAL at 12:17

## 2023-07-24 RX ADMIN — PROPOFOL 25 MG: 10 INJECTION, EMULSION INTRAVENOUS at 12:11

## 2023-07-24 RX ADMIN — PROPOFOL 25 MG: 10 INJECTION, EMULSION INTRAVENOUS at 12:18

## 2023-07-24 RX ADMIN — FLUOXETINE 20 MG: 20 CAPSULE ORAL at 20:14

## 2023-07-24 RX ADMIN — CEFTRIAXONE SODIUM 1000 MG: 1 INJECTION, POWDER, FOR SOLUTION INTRAMUSCULAR; INTRAVENOUS at 11:15

## 2023-07-24 RX ADMIN — PROPOFOL 25 MG: 10 INJECTION, EMULSION INTRAVENOUS at 12:14

## 2023-07-24 RX ADMIN — PROPOFOL 75 MG: 10 INJECTION, EMULSION INTRAVENOUS at 12:07

## 2023-07-24 RX ADMIN — FENTANYL CITRATE 25 MCG: 50 INJECTION, SOLUTION INTRAMUSCULAR; INTRAVENOUS at 12:07

## 2023-07-24 RX ADMIN — AMLODIPINE BESYLATE AND BENAZEPRIL HYDROCHLORIDE 1 CAPSULE: 5; 10 CAPSULE ORAL at 20:14

## 2023-07-24 RX ADMIN — PROPOFOL 25 MG: 10 INJECTION, EMULSION INTRAVENOUS at 12:20

## 2023-07-24 RX ADMIN — SODIUM CHLORIDE: 9 INJECTION, SOLUTION INTRAVENOUS at 15:07

## 2023-07-24 RX ADMIN — KETOROLAC TROMETHAMINE 15 MG: 30 INJECTION, SOLUTION INTRAMUSCULAR; INTRAVENOUS at 00:52

## 2023-07-24 RX ADMIN — DEXAMETHASONE SODIUM PHOSPHATE 10 MG: 4 INJECTION, SOLUTION INTRAMUSCULAR; INTRAVENOUS at 12:08

## 2023-07-24 RX ADMIN — ACETAMINOPHEN 650 MG: 325 TABLET ORAL at 22:08

## 2023-07-24 RX ADMIN — FENTANYL CITRATE 50 MCG: 50 INJECTION, SOLUTION INTRAMUSCULAR; INTRAVENOUS at 12:16

## 2023-07-24 RX ADMIN — KETOROLAC TROMETHAMINE 15 MG: 30 INJECTION, SOLUTION INTRAMUSCULAR; INTRAVENOUS at 07:59

## 2023-07-24 RX ADMIN — FENTANYL CITRATE 25 MCG: 50 INJECTION, SOLUTION INTRAMUSCULAR; INTRAVENOUS at 12:02

## 2023-07-24 RX ADMIN — LIDOCAINE HYDROCHLORIDE 50 MG: 20 INJECTION, SOLUTION EPIDURAL; INFILTRATION; INTRACAUDAL; PERINEURAL at 12:07

## 2023-07-24 RX ADMIN — TAMSULOSIN HYDROCHLORIDE 0.4 MG: 0.4 CAPSULE ORAL at 07:59

## 2023-07-24 ASSESSMENT — PAIN DESCRIPTION - LOCATION
LOCATION: ABDOMEN;FLANK
LOCATION: ABDOMEN;FLANK
LOCATION: ABDOMEN

## 2023-07-24 ASSESSMENT — PAIN DESCRIPTION - ORIENTATION
ORIENTATION: LOWER
ORIENTATION: RIGHT
ORIENTATION: RIGHT

## 2023-07-24 ASSESSMENT — PAIN SCALES - GENERAL
PAINLEVEL_OUTOF10: 3
PAINLEVEL_OUTOF10: 2
PAINLEVEL_OUTOF10: 5
PAINLEVEL_OUTOF10: 6

## 2023-07-24 ASSESSMENT — PAIN DESCRIPTION - PAIN TYPE: TYPE: ACUTE PAIN

## 2023-07-24 ASSESSMENT — PAIN DESCRIPTION - DESCRIPTORS
DESCRIPTORS: DISCOMFORT
DESCRIPTORS: ACHING;DISCOMFORT
DESCRIPTORS: ACHING;DISCOMFORT

## 2023-07-24 ASSESSMENT — PAIN - FUNCTIONAL ASSESSMENT
PAIN_FUNCTIONAL_ASSESSMENT: PREVENTS OR INTERFERES SOME ACTIVE ACTIVITIES AND ADLS
PAIN_FUNCTIONAL_ASSESSMENT: ACTIVITIES ARE NOT PREVENTED

## 2023-07-24 NOTE — ANESTHESIA PRE PROCEDURE
Screen (If Applicable):  No results found for: LABABO, LABRH    Drug/Infectious Status (If Applicable):  No results found for: HIV, HEPCAB    COVID-19 Screening (If Applicable): No results found for: COVID19        Anesthesia Evaluation  Patient summary reviewed and Nursing notes reviewed  Airway: Mallampati: II          Dental:          Pulmonary: breath sounds clear to auscultation                             Cardiovascular:    (+) hypertension:,         Rhythm: regular  Rate: normal                    Neuro/Psych:               GI/Hepatic/Renal:   (+) renal disease: kidney stones,           Endo/Other:                     Abdominal:       Abdomen: soft. Vascular: Other Findings:           Anesthesia Plan      MAC     ASA 2       Induction: intravenous. MIPS: Postoperative opioids intended and Prophylactic antiemetics administered. Anesthetic plan and risks discussed with patient and spouse. Plan discussed with CRNA.                     [address] [place] [address],    7/24/2023

## 2023-07-24 NOTE — ANESTHESIA POSTPROCEDURE EVALUATION
Department of Anesthesiology  Postprocedure Note    Patient: Eleazar Romero  MRN: [id number]  YOB: 1965  Date of evaluation: 7/24/2023      Procedure Summary     Date: 07/24/23 Room / Location: [facility]    Anesthesia Start: 9646 Anesthesia Stop: 1227    Procedure: CYSTOSCOPY URETERAL STENT INSERTION Diagnosis:       Dysuria      (Dysuria [R30.0])    Surgeons: Isiah Keyes MD Responsible Provider: DO ANGELITO    Anesthesia Type: MAC ASA Status: 2          Anesthesia Type: MAC    Tran Phase I:      Tran Phase II:        Anesthesia Post Evaluation    Patient location during evaluation: bedside  Patient participation: complete - patient participated  Level of consciousness: awake  Airway patency: patent  Nausea & Vomiting: no nausea  Complications: no  Cardiovascular status: hemodynamically stable  Respiratory status: acceptable  Hydration status: stable

## 2023-07-24 NOTE — OP NOTE
FACILITY: [address] [place] [address]   Brian Gordillo  1965  [id number]    DATE:  07/24/23  SURGEON:  Dr. Shaw Bain MD , M.D.  Indore Counter:  Dr. Shaw Bain MD M.D. PREOPERATIVE DIAGNOSIS: right Kidney stone with UTI  POSTOPERATIVE DIAGNOSIS: Same  PROCEDURES PERFORMED:  1. Cystoscopy. 2. Right sided stent placement. DRAINS: A right sided 6x26 cm double-J ureteral stent  SPECIMEN: none  ANESTHESIA: General  ESTIMATED BLOOD LOSS:  None. COMPLICATIONS:  None. INDICATIONS FOR PROCEDURE:  Brian Gordillo is a 62 y.o. female presents for right kidney stone. After the risks, benefits, alternatives, of the procedure were discussed with the patient, informed consent was obtained. The patient elected to proceed. DETAILS OF THE PROCEDURE:  The patient was brought back to the preoperative holding area to the operating suite, and was transferred to the operating table where the patient lay in supine position. General endotracheal anesthesia was induced, and patient was prepped and draped in standard surgical fashion after being placed in dorsolithotomy position. A proper timeout was performed, preoperative antibiotics were given. A rigid cystoscope with a 22 Armenian sheath with 30 degree lens was inserted in the patient's urethral and into the bladder with ease. We then focused our attention on the right ureteral orifice, which we cannulated with our glidewire. Over our glidewire we placed a 6x26 cm double-J ureteral stent and we noted appropriate placement in the upper collecting system using fluoroscopy. We then removed our wire. There was good proximal and distal curl. We did not leave the string at the end of the stent. We then drained the patient's bladder and removed the scope and the procedure was subsequently terminated. Plan:   The patient will be discharged home in good condition.   The patient will need to follow up for definitive stone management: URS HLL and stent exchange 1-2

## 2023-07-25 ENCOUNTER — TELEPHONE (OUTPATIENT)
Dept: UROLOGY | Age: 58
End: 2023-07-25

## 2023-07-25 VITALS
SYSTOLIC BLOOD PRESSURE: 127 MMHG | HEART RATE: 87 BPM | DIASTOLIC BLOOD PRESSURE: 62 MMHG | OXYGEN SATURATION: 97 % | WEIGHT: 188.49 LBS | BODY MASS INDEX: 37.01 KG/M2 | TEMPERATURE: 97.5 F | HEIGHT: 60 IN | RESPIRATION RATE: 18 BRPM

## 2023-07-25 LAB
ANION GAP SERPL CALC-SCNC: 11 MEQ/L (ref 8–16)
BASOPHILS ABSOLUTE: 0 THOU/MM3 (ref 0–0.1)
BASOPHILS NFR BLD AUTO: 0.1 %
BUN SERPL-MCNC: 13 MG/DL (ref 7–22)
CALCIUM SERPL-MCNC: 8.8 MG/DL (ref 8.5–10.5)
CHLORIDE SERPL-SCNC: 105 MEQ/L (ref 98–111)
CO2 SERPL-SCNC: 23 MEQ/L (ref 23–33)
CREAT SERPL-MCNC: 1 MG/DL (ref 0.4–1.2)
DEPRECATED RDW RBC AUTO: 42.5 FL (ref 35–45)
EOSINOPHIL NFR BLD AUTO: 0 %
EOSINOPHILS ABSOLUTE: 0 THOU/MM3 (ref 0–0.4)
ERYTHROCYTE [DISTWIDTH] IN BLOOD BY AUTOMATED COUNT: 12.3 % (ref 11.5–14.5)
GFR SERPL CREATININE-BSD FRML MDRD: > 60 ML/MIN/1.73M2
GLUCOSE SERPL-MCNC: 215 MG/DL (ref 70–108)
HCT VFR BLD AUTO: 33.3 % (ref 37–47)
HGB BLD-MCNC: 10.3 GM/DL (ref 12–16)
IMM GRANULOCYTES # BLD AUTO: 0.19 THOU/MM3 (ref 0–0.07)
IMM GRANULOCYTES NFR BLD AUTO: 1.6 %
LYMPHOCYTES ABSOLUTE: 1 THOU/MM3 (ref 1–4.8)
LYMPHOCYTES NFR BLD AUTO: 8.7 %
MCH RBC QN AUTO: 28.9 PG (ref 26–33)
MCHC RBC AUTO-ENTMCNC: 30.9 GM/DL (ref 32.2–35.5)
MCV RBC AUTO: 93.3 FL (ref 81–99)
MONOCYTES ABSOLUTE: 0.2 THOU/MM3 (ref 0.4–1.3)
MONOCYTES NFR BLD AUTO: 1.9 %
NEUTROPHILS NFR BLD AUTO: 87.7 %
NRBC BLD AUTO-RTO: 0 /100 WBC
PLATELET # BLD AUTO: 412 THOU/MM3 (ref 130–400)
PMV BLD AUTO: 8.7 FL (ref 9.4–12.4)
POTASSIUM SERPL-SCNC: 4.6 MEQ/L (ref 3.5–5.2)
RBC # BLD AUTO: 3.57 MILL/MM3 (ref 4.2–5.4)
SEGMENTED NEUTROPHILS ABSOLUTE COUNT: 10.4 THOU/MM3 (ref 1.8–7.7)
SODIUM SERPL-SCNC: 139 MEQ/L (ref 135–145)
WBC # BLD AUTO: 11.9 THOU/MM3 (ref 4.8–10.8)

## 2023-07-25 PROCEDURE — 6360000002 HC RX W HCPCS: Performed by: PHYSICIAN ASSISTANT

## 2023-07-25 PROCEDURE — G0378 HOSPITAL OBSERVATION PER HR: HCPCS

## 2023-07-25 PROCEDURE — 6370000000 HC RX 637 (ALT 250 FOR IP): Performed by: PHYSICIAN ASSISTANT

## 2023-07-25 PROCEDURE — 80048 BASIC METABOLIC PNL TOTAL CA: CPT

## 2023-07-25 PROCEDURE — 99239 HOSP IP/OBS DSCHRG MGMT >30: CPT | Performed by: INTERNAL MEDICINE

## 2023-07-25 PROCEDURE — 36415 COLL VENOUS BLD VENIPUNCTURE: CPT

## 2023-07-25 PROCEDURE — 85025 COMPLETE CBC W/AUTO DIFF WBC: CPT

## 2023-07-25 PROCEDURE — 2580000003 HC RX 258: Performed by: PHYSICIAN ASSISTANT

## 2023-07-25 PROCEDURE — 96361 HYDRATE IV INFUSION ADD-ON: CPT

## 2023-07-25 RX ORDER — TAMSULOSIN HYDROCHLORIDE 0.4 MG/1
0.4 CAPSULE ORAL DAILY
Qty: 30 CAPSULE | Refills: 3 | Status: SHIPPED | OUTPATIENT
Start: 2023-07-26

## 2023-07-25 RX ORDER — CEFDINIR 300 MG/1
600 CAPSULE ORAL DAILY
Qty: 20 CAPSULE | Refills: 0 | Status: SHIPPED | OUTPATIENT
Start: 2023-07-25 | End: 2023-08-04

## 2023-07-25 RX ADMIN — CEFTRIAXONE SODIUM 1000 MG: 1 INJECTION, POWDER, FOR SOLUTION INTRAMUSCULAR; INTRAVENOUS at 10:16

## 2023-07-25 RX ADMIN — SODIUM CHLORIDE: 9 INJECTION, SOLUTION INTRAVENOUS at 05:09

## 2023-07-25 RX ADMIN — TAMSULOSIN HYDROCHLORIDE 0.4 MG: 0.4 CAPSULE ORAL at 08:56

## 2023-07-25 RX ADMIN — ACETAMINOPHEN 650 MG: 325 TABLET ORAL at 08:58

## 2023-07-25 ASSESSMENT — PAIN - FUNCTIONAL ASSESSMENT: PAIN_FUNCTIONAL_ASSESSMENT: ACTIVITIES ARE NOT PREVENTED

## 2023-07-25 ASSESSMENT — PAIN DESCRIPTION - ORIENTATION: ORIENTATION: LOWER

## 2023-07-25 ASSESSMENT — PAIN DESCRIPTION - FREQUENCY: FREQUENCY: INTERMITTENT

## 2023-07-25 ASSESSMENT — PAIN DESCRIPTION - PAIN TYPE: TYPE: ACUTE PAIN

## 2023-07-25 ASSESSMENT — PAIN DESCRIPTION - LOCATION: LOCATION: ABDOMEN

## 2023-07-25 ASSESSMENT — PAIN SCALES - GENERAL
PAINLEVEL_OUTOF10: 3
PAINLEVEL_OUTOF10: 2

## 2023-07-25 ASSESSMENT — PAIN DESCRIPTION - ONSET: ONSET: ON-GOING

## 2023-07-25 ASSESSMENT — PAIN DESCRIPTION - DESCRIPTORS: DESCRIPTORS: DISCOMFORT

## 2023-07-25 NOTE — TELEPHONE ENCOUNTER
Right stent for ureteral stone   Needs scheduled for definitive stone management: URS HLL and stent exchange 1-2 weeks wt Dr Edgar Valdes

## 2023-07-25 NOTE — CARE COORDINATION
7/25/23, 9:46 AM EDT    Patient goals/plan/ treatment preferences discussed by  and . Patient goals/plan/ treatment preferences reviewed with patient/ family. Patient/ family verbalize understanding of discharge plan and are in agreement with goal/plan/treatment preferences. Understanding was demonstrated using the teach back method. AVS provided by RN at time of discharge, which includes all necessary medical information pertaining to the patients current course of illness, treatment, post-discharge goals of care, and treatment preferences.      Services At/After Discharge: None

## 2023-07-26 ENCOUNTER — TELEPHONE (OUTPATIENT)
Dept: UROLOGY | Age: 58
End: 2023-07-26

## 2023-07-26 ENCOUNTER — TELEPHONE (OUTPATIENT)
Dept: FAMILY MEDICINE CLINIC | Age: 58
End: 2023-07-26

## 2023-07-26 DIAGNOSIS — N20.0 KIDNEY STONE: Primary | ICD-10-CM

## 2023-07-26 DIAGNOSIS — Z01.818 PRE-OP TESTING: ICD-10-CM

## 2023-07-26 NOTE — TELEPHONE ENCOUNTER
Patient is scheduled for surgery with  on 8/21/23. Surgery consent to be done on arrival.Patient states he/she does not follow with a cardiologist. Patient to do pre op. Surgery instructions gone over with patient verbally in the office or mailed to the patient. Patient informed an adult over the age of 25 must be with them at the time of surgery, upon discharge and at home for 24 hours after the procedure.

## 2023-07-26 NOTE — TELEPHONE ENCOUNTER
DO NOT TAKE  FISH OIL, MOBIC, IBUPROFEN, MOTRIN-LIKE DRUGS AND ANY MULTIVITAMINS OR OVER THE COUNTER SUPPLEMENTS 14 DAYS PRIOR TO SURGERY. HOLD THE ASPIRIN 5 DAYS PRIOR TO THE SURGERY    MUST HAVE AN ADULT OVER THE AGE OF 18 WITH YOU AT THE TIME OF THE DISCHARGE AND WITH YOU AT HOME AFTER THE PROCEDURE FOR 24 HOURS          Shant Rouse Chidi 1965 Diagnosis: Kidney Stone    Surgical Physician: Dr. Jigar Quiroz have been scheduled for the procedure marked below:      Surgery: Cystoscopy, right ureteroscopy, laser lithotripsy, basket retrieval of stone fragments, and possible right ureteral stent exchange         Date: 8/21/23     Anesthesia: Anesthesiologist (General/Spinal)     Place of Service: [facility] Second Floor Same Day Surgery         Arrive to same day surgery at:  8:00 am  (Surgery time is subject to change)      INSTRUCTIONS AS MARKED BELOW:    1.  DO NOT eat or drink anything after midnight before surgery. 2.  We prefer you shower or bathe with an antibacterial soap (Dial) the morning of surgery. 3  Please bring a current medication list, photo ID and insurance card(s) with you  4. Okay to take Tylenol  5. If you take Glucophage, Metformin or Janumet, hold 48-hours prior to surgery  6  Take blood pressure or heart medication as directed, if taken in the morning take with a small sip of water  7. The office will call you in 1-2 days after your procedure to schedule a follow up. DATE SENSITIVE TESTING-DO ON THE DATE LISTED *WALK IN *NO APPOINTMENT    DO THE PRE URINE CULTURE AND EKG ON 8/10/23.  ORDERS INCLUDED        Date: 7/26/2023

## 2023-07-26 NOTE — TELEPHONE ENCOUNTER
SURGERY [address] [facility], [address] [facility] [address]      Phone *[phone] *[phone]   Surgical Scheduling Direct Line Phone *[phone] Fax *[phone]      Luc Johnson 1965 female    [address] [facility] [address] [place]   Marital Status:          Home Phone: [phone]      Cell Phone:    Telephone Information:   Mobile [phone]          Surgeon: Dr. Candelario [employer] Surgery Date: 8/21/23   Time: 10:00 am    Procedure: Cystoscopy, right ureteroscopy, laser lithotripsy, basket retrieval of stone fragments, and possible right ureteral stent exchange    Diagnosis: Kidney Stone     Important Medical History:  In Epic    Special Inst/Equip:     CPT Codes:    09112  Latex Allergy: No     Cardiac Device:  No    Anesthesia:  General          Admission Type:  Same Day                        Admit Prior to Day of Surgery: No    Case Location:  Main OR            Preadmission Testing:  Phone Call          PAT Date and Time:______________________________________________________    PAT Confirmation #: ______________________________________________________    Post Op Visit: ___________________________________________________________    Need Preop Cardiac Clearance: No    Does Patient have Cardiologist/physician?      None    Surgery Confirmation #: __________________________________________________    : ________________________   Date: __________________________     [employer] Name: Medical Donora

## 2023-07-26 NOTE — TELEPHONE ENCOUNTER
Care Transitions Initial Follow Up Call    Outreach made within 2 business days of discharge: Yes    Patient: Luc Johnson Patient : 1965   MRN: [id number]  Reason for Admission: There are no discharge diagnoses documented for the most recent discharge. Discharge Date: 23       Spoke with: patient    Discharge department/facility: [facility]    TCM Interactive Patient Contact:  Was patient able to fill all prescriptions: Yes  Was patient instructed to bring all medications to the follow-up visit: Yes  Is patient taking all medications as directed in the discharge summary?  Yes  Does patient understand their discharge instructions: Yes  Does patient have questions or concerns that need addressed prior to 7-14 day follow up office visit: no    Scheduled appointment with PCP within 7-14 days    Follow Up  Future Appointments   Date Time Provider [address] [place] [address]   2023 10:15 AM Radha Maloney  [address]       Eligio Thorne LPN

## 2023-07-27 LAB
BACTERIA BLD AEROBE CULT: NORMAL
BACTERIA BLD AEROBE CULT: NORMAL

## 2023-07-31 ENCOUNTER — OFFICE VISIT (OUTPATIENT)
Dept: FAMILY MEDICINE CLINIC | Age: 58
End: 2023-07-31

## 2023-07-31 VITALS
BODY MASS INDEX: 36.62 KG/M2 | SYSTOLIC BLOOD PRESSURE: 116 MMHG | WEIGHT: 187.5 LBS | HEART RATE: 76 BPM | RESPIRATION RATE: 16 BRPM | DIASTOLIC BLOOD PRESSURE: 72 MMHG

## 2023-07-31 DIAGNOSIS — Q60.5 CONGENITAL SMALL KIDNEY: ICD-10-CM

## 2023-07-31 DIAGNOSIS — N17.9 AKI (ACUTE KIDNEY INJURY) (HCC): ICD-10-CM

## 2023-07-31 DIAGNOSIS — E11.9 DIET-CONTROLLED DIABETES MELLITUS (HCC): ICD-10-CM

## 2023-07-31 DIAGNOSIS — N10 ACUTE PYELONEPHRITIS: ICD-10-CM

## 2023-07-31 DIAGNOSIS — N13.30 HYDRONEPHROSIS, UNSPECIFIED HYDRONEPHROSIS TYPE: ICD-10-CM

## 2023-07-31 DIAGNOSIS — I10 HYPERTENSION, UNSPECIFIED TYPE: ICD-10-CM

## 2023-07-31 DIAGNOSIS — R78.81 BACTEREMIA: Primary | ICD-10-CM

## 2023-07-31 DIAGNOSIS — Z09 HOSPITAL DISCHARGE FOLLOW-UP: ICD-10-CM

## 2023-08-01 ENCOUNTER — PREP FOR PROCEDURE (OUTPATIENT)
Dept: UROLOGY | Age: 58
End: 2023-08-01

## 2023-08-07 ENCOUNTER — OFFICE VISIT (OUTPATIENT)
Dept: FAMILY MEDICINE CLINIC | Age: 58
End: 2023-08-07
Payer: COMMERCIAL

## 2023-08-07 ENCOUNTER — TELEPHONE (OUTPATIENT)
Dept: FAMILY MEDICINE CLINIC | Age: 58
End: 2023-08-07

## 2023-08-07 VITALS
BODY MASS INDEX: 37.09 KG/M2 | DIASTOLIC BLOOD PRESSURE: 82 MMHG | WEIGHT: 189.9 LBS | SYSTOLIC BLOOD PRESSURE: 128 MMHG | HEART RATE: 100 BPM | RESPIRATION RATE: 16 BRPM

## 2023-08-07 DIAGNOSIS — R30.0 DYSURIA: Primary | ICD-10-CM

## 2023-08-07 DIAGNOSIS — R35.0 FREQUENCY OF URINATION: ICD-10-CM

## 2023-08-07 LAB
BILIRUBIN, POC: ABNORMAL
BLOOD URINE, POC: ABNORMAL
CLARITY, POC: ABNORMAL
COLOR, POC: ABNORMAL
GLUCOSE URINE, POC: ABNORMAL
KETONES, POC: ABNORMAL
LEUKOCYTE EST, POC: ABNORMAL
NITRITE, POC: ABNORMAL
PH, POC: 6.5
PROTEIN, POC: 300
SPECIFIC GRAVITY, POC: 1.01
UROBILINOGEN, POC: 0.2

## 2023-08-07 PROCEDURE — 3017F COLORECTAL CA SCREEN DOC REV: CPT | Performed by: NURSE PRACTITIONER

## 2023-08-07 PROCEDURE — 3079F DIAST BP 80-89 MM HG: CPT | Performed by: NURSE PRACTITIONER

## 2023-08-07 PROCEDURE — 1036F TOBACCO NON-USER: CPT | Performed by: NURSE PRACTITIONER

## 2023-08-07 PROCEDURE — 99213 OFFICE O/P EST LOW 20 MIN: CPT | Performed by: NURSE PRACTITIONER

## 2023-08-07 PROCEDURE — G8417 CALC BMI ABV UP PARAM F/U: HCPCS | Performed by: NURSE PRACTITIONER

## 2023-08-07 PROCEDURE — G8427 DOCREV CUR MEDS BY ELIG CLIN: HCPCS | Performed by: NURSE PRACTITIONER

## 2023-08-07 PROCEDURE — 3074F SYST BP LT 130 MM HG: CPT | Performed by: NURSE PRACTITIONER

## 2023-08-07 PROCEDURE — 81003 URINALYSIS AUTO W/O SCOPE: CPT | Performed by: NURSE PRACTITIONER

## 2023-08-07 RX ORDER — SODIUM CHLORIDE 0.9 % (FLUSH) 0.9 %
5-40 SYRINGE (ML) INJECTION EVERY 12 HOURS SCHEDULED
Status: CANCELLED | OUTPATIENT
Start: 2023-08-07

## 2023-08-07 RX ORDER — SODIUM CHLORIDE 9 MG/ML
INJECTION, SOLUTION INTRAVENOUS PRN
Status: CANCELLED | OUTPATIENT
Start: 2023-08-07

## 2023-08-07 RX ORDER — CIPROFLOXACIN 500 MG/1
500 TABLET, FILM COATED ORAL 2 TIMES DAILY
Qty: 14 TABLET | Refills: 0 | Status: SHIPPED | OUTPATIENT
Start: 2023-08-07 | End: 2023-08-14

## 2023-08-07 RX ORDER — SODIUM CHLORIDE 0.9 % (FLUSH) 0.9 %
5-40 SYRINGE (ML) INJECTION PRN
Status: CANCELLED | OUTPATIENT
Start: 2023-08-07

## 2023-08-07 ASSESSMENT — ENCOUNTER SYMPTOMS
COUGH: 0
ABDOMINAL PAIN: 0
NAUSEA: 0
SHORTNESS OF BREATH: 0

## 2023-08-07 NOTE — TELEPHONE ENCOUNTER
----- Message from [facility] sent at 8/7/2023  8:05 AM EDT -----  Subject: Message to Provider    QUESTIONS  Information for Provider? patient called in she thinks she has another   uti. she beleives it has come back . please follow up asap  ---------------------------------------------------------------------------  --------------  Jorge Ben [place]omond Ad  [phone]; OK to leave message on voicemail  ---------------------------------------------------------------------------  --------------  SCRIPT ANSWERS  Relationship to Patient? Self  Have you recently (14 days) seen a provider for this problem?  Yes

## 2023-08-07 NOTE — PROGRESS NOTES
rhythm. Heart sounds: No murmur heard. Pulmonary:      Effort: Pulmonary effort is normal.      Breath sounds: Normal breath sounds. No wheezing. Abdominal:      General: Bowel sounds are normal. There is no distension. Palpations: Abdomen is soft. Tenderness: There is no abdominal tenderness. Musculoskeletal:         General: No tenderness. Normal range of motion. Cervical back: Normal range of motion and neck supple. Skin:     General: Skin is warm and dry. Findings: No rash. ASSESSMENT/PLAN:   Diagnosis Orders   1. Dysuria  POCT Urinalysis No Micro (Auto)      2. Frequency of urination  POCT Urinalysis No Micro (Auto)            MDM:  UA: mod BLO larg LEUK  Culture sent  Cipro BID  Push fluids! Follow up with URO  RTO PRN    An electronic signature was used to authenticate this note.     --Heaven Olvera, APRN - CNP

## 2023-08-08 LAB
BACTERIA UR CULT: ABNORMAL
ORGANISM: ABNORMAL

## 2023-08-09 ENCOUNTER — TELEPHONE (OUTPATIENT)
Dept: FAMILY MEDICINE CLINIC | Age: 58
End: 2023-08-09

## 2023-08-09 NOTE — TELEPHONE ENCOUNTER
Agree would wait till Monday to repeat UA and EKG as she does have a UTI even with the culture showing mixed growth. Would take ATB for 5 days in total instead of 7.

## 2023-08-09 NOTE — TELEPHONE ENCOUNTER
Patient Communication     Edit Comments   Add Notifications  Back to Top    No specific bacteria as to the cause of UTI - getting relief with antibiotic?    Written by TIFF Hu CNP on 8/9/2023  7:46 AM EDT

## 2023-08-09 NOTE — TELEPHONE ENCOUNTER
Pt called office to get results of her UA that was done on Monday. Pt was advised a MyChart msg had been sent to her, I did review the msg with her. Pt says she is getting relief with the antibiotic and feels much better than she did on Monday. Please advise. Pt is scheduled tomorrow to go in for pre op testing for her upcoming surgery. She is to get a UA and EKG. The lady said if she has a UTI and on antibiotics, then she should wait until next week to do the UA when she is done with the antibiotics. Pt says since she doesn't have a UTI, does she need to wait on the UA? Please advise.

## 2023-08-14 ENCOUNTER — HOSPITAL ENCOUNTER (OUTPATIENT)
Age: 58
Discharge: HOME | End: 2023-08-14
Payer: COMMERCIAL

## 2023-08-14 DIAGNOSIS — N20.0 KIDNEY STONE: ICD-10-CM

## 2023-08-14 DIAGNOSIS — Z01.818 PRE-OP TESTING: ICD-10-CM

## 2023-08-14 PROCEDURE — 93005 ELECTROCARDIOGRAM TRACING: CPT

## 2023-08-14 PROCEDURE — 87086 URINE CULTURE/COLONY COUNT: CPT

## 2023-08-14 PROCEDURE — 93010 ELECTROCARDIOGRAM REPORT: CPT | Performed by: INTERNAL MEDICINE

## 2023-08-14 NOTE — PROGRESS NOTES
PAT Call Date: 8/14   Surgery Date: 8/21    Surgeon: Edgar Valdes   Surgery:litho+    Is patient from a nursing home? No   Any Isolation Precautions? No   Any Pacemaker or ICD? No If YES, has it been checked recently and where? Has the rep been notified? No     On Snapboard?  No  7/24 29 [address] 7/25   Hard Copy on Chart  In EPIC Pending/Notes   Consent -   Within 30 days; signed, dated & timed by patient and physician     [x] On Arrival     [] Blood    Additional Consent Needs:     H&P - Within 30 days    [] Physician To Do     [] H&P Update - If H&P is older then 24 hours    Clearance -  Medical, Cardiac, Pulmonary, etc.       Orders - Signed and Dated    Copy Sent to Pharm []  8/21 SURG TAB  [] Physician To Do    Labs - Within 3 months   7/25 while in hospital        ORDERED  [x] CBC    [x] BMP   [x] GFR   [] INR    [] PTT    [x] Urine    [] Liver Enzymes    [] Kidney Function    [] MRSA Nasal   [] MSSA      Others:    Radiology Studies-   Within 1 year    [] Chest X-Ray   [] MRI    [] CT    EKG -   Within 1 year, unless hx of HTN  ORDERED    Cardiac Workup -   Stress Test, Echo, Cath within 18 months    [] Cath                                [] Stress Test                      [] Echo    [] Holter Monitor    [] RAULITO

## 2023-08-14 NOTE — PROGRESS NOTES
Follow all instructions given by your physician  NPO after midnight  Sips of water am of surgery with allowed medications  Bring insurance info and 's license  Wear clean comfortable , loose-fitting clothing  No jewelry or contact lenses to be worn day of surgery  No glue on dentures morning of surgery; you will be asked to remove them for surgery. Case for glasses. Shower the night before and the morning of surgery with a liquid antibacterial soap, dry with new fresh clean towel after each shower, no lotions, creams or powder. Clean sheets and pillowcase on bed night before surgery  Bring medications in original bottles  Bring CPAP/BIPAP machine if you have one ( you may be charged if one is needed in recovery room )    Our pharmacy has a Meds to [facility] program where they will deliver any new prescriptions you may have to your room before you leave. Our Pharmacy will clear it through your insurance; for example (same co pay). This enables you to take your new RX as soon as you need when you get home and avoids stop/wait delays on the way home. Please have a form of payment with you and have someone designated as your Pharmacy contact with their phone # as you may not feel well or still be under the influence of anesthesia. Please refer to the SSI-Surgical Site Infection Flyer you hopefully received in the mail-together we can prevent infections; signs and symptoms reviewed. When discharged be sure you understand how to care for your wound and that you have the Dr./office phone # to call if you have any concerns or questions about your wound.      needed at discharge and someone over 18 to stay with you for 24 hours overnight (surgery may be cancelled if you don't have this)  Report to [facility] on 2nd floor  If you would become ill prior to surgery, please call the surgeon  May have a visitor with you, we request that you limit to 2 visitors in pre-op area  Masks are recommended but not required, new

## 2023-08-15 LAB
EKG ATRIAL RATE: 68 BPM
EKG P AXIS: 23 DEGREES
EKG P-R INTERVAL: 156 MS
EKG Q-T INTERVAL: 392 MS
EKG QRS DURATION: 74 MS
EKG QTC CALCULATION (BAZETT): 416 MS
EKG R AXIS: -21 DEGREES
EKG T AXIS: -5 DEGREES
EKG VENTRICULAR RATE: 68 BPM

## 2023-08-16 LAB
BACTERIA UR CULT: ABNORMAL
ORGANISM: ABNORMAL

## 2023-08-21 ENCOUNTER — ANESTHESIA (OUTPATIENT)
Dept: OPERATING ROOM | Age: 58
End: 2023-08-21
Payer: COMMERCIAL

## 2023-08-21 ENCOUNTER — ANESTHESIA EVENT (OUTPATIENT)
Dept: OPERATING ROOM | Age: 58
End: 2023-08-21
Payer: COMMERCIAL

## 2023-08-21 ENCOUNTER — HOSPITAL ENCOUNTER (OUTPATIENT)
Age: 58
Setting detail: OUTPATIENT SURGERY
Discharge: HOME | End: 2023-08-21
Attending: UROLOGY | Admitting: UROLOGY
Payer: COMMERCIAL

## 2023-08-21 VITALS
BODY MASS INDEX: 31.37 KG/M2 | TEMPERATURE: 97 F | DIASTOLIC BLOOD PRESSURE: 59 MMHG | RESPIRATION RATE: 16 BRPM | WEIGHT: 159.8 LBS | OXYGEN SATURATION: 97 % | HEIGHT: 60 IN | SYSTOLIC BLOOD PRESSURE: 108 MMHG | HEART RATE: 74 BPM

## 2023-08-21 DIAGNOSIS — N20.0 KIDNEY STONE: ICD-10-CM

## 2023-08-21 DIAGNOSIS — A41.9 SEPSIS DUE TO URINARY TRACT INFECTION (HCC): Primary | ICD-10-CM

## 2023-08-21 DIAGNOSIS — N39.0 SEPSIS DUE TO URINARY TRACT INFECTION (HCC): Primary | ICD-10-CM

## 2023-08-21 PROCEDURE — C1758 CATHETER, URETERAL: HCPCS | Performed by: UROLOGY

## 2023-08-21 PROCEDURE — 3600000013 HC SURGERY LEVEL 3 ADDTL 15MIN: Performed by: UROLOGY

## 2023-08-21 PROCEDURE — 7100000010 HC PHASE II RECOVERY - FIRST 15 MIN: Performed by: UROLOGY

## 2023-08-21 PROCEDURE — 7100000000 HC PACU RECOVERY - FIRST 15 MIN: Performed by: UROLOGY

## 2023-08-21 PROCEDURE — C1747 HC ENDOSCOPE, SINGLE, URINARY TRACT: HCPCS | Performed by: UROLOGY

## 2023-08-21 PROCEDURE — 3700000001 HC ADD 15 MINUTES (ANESTHESIA): Performed by: UROLOGY

## 2023-08-21 PROCEDURE — 7100000001 HC PACU RECOVERY - ADDTL 15 MIN: Performed by: UROLOGY

## 2023-08-21 PROCEDURE — 3600000003 HC SURGERY LEVEL 3 BASE: Performed by: UROLOGY

## 2023-08-21 PROCEDURE — C1769 GUIDE WIRE: HCPCS | Performed by: UROLOGY

## 2023-08-21 PROCEDURE — 6360000002 HC RX W HCPCS: Performed by: UROLOGY

## 2023-08-21 PROCEDURE — 2580000003 HC RX 258: Performed by: UROLOGY

## 2023-08-21 PROCEDURE — 3700000000 HC ANESTHESIA ATTENDED CARE: Performed by: UROLOGY

## 2023-08-21 PROCEDURE — 6370000000 HC RX 637 (ALT 250 FOR IP): Performed by: UROLOGY

## 2023-08-21 PROCEDURE — C2617 STENT, NON-COR, TEM W/O DEL: HCPCS | Performed by: UROLOGY

## 2023-08-21 PROCEDURE — 6360000002 HC RX W HCPCS: Performed by: NURSE ANESTHETIST, CERTIFIED REGISTERED

## 2023-08-21 PROCEDURE — 2709999900 HC NON-CHARGEABLE SUPPLY: Performed by: UROLOGY

## 2023-08-21 PROCEDURE — 2720000010 HC SURG SUPPLY STERILE: Performed by: UROLOGY

## 2023-08-21 PROCEDURE — 2500000003 HC RX 250 WO HCPCS: Performed by: NURSE ANESTHETIST, CERTIFIED REGISTERED

## 2023-08-21 PROCEDURE — 7100000011 HC PHASE II RECOVERY - ADDTL 15 MIN: Performed by: UROLOGY

## 2023-08-21 DEVICE — URETERAL STENT
Type: IMPLANTABLE DEVICE | Site: URETER | Status: FUNCTIONAL
Brand: PERCUFLEX™ PLUS

## 2023-08-21 RX ORDER — ONDANSETRON 2 MG/ML
INJECTION INTRAMUSCULAR; INTRAVENOUS PRN
Status: DISCONTINUED | OUTPATIENT
Start: 2023-08-21 | End: 2023-08-21 | Stop reason: SDUPTHER

## 2023-08-21 RX ORDER — SODIUM CHLORIDE 0.9 % (FLUSH) 0.9 %
5-40 SYRINGE (ML) INJECTION EVERY 12 HOURS SCHEDULED
Status: DISCONTINUED | OUTPATIENT
Start: 2023-08-21 | End: 2023-08-21 | Stop reason: HOSPADM

## 2023-08-21 RX ORDER — SODIUM CHLORIDE 9 MG/ML
INJECTION, SOLUTION INTRAVENOUS PRN
Status: DISCONTINUED | OUTPATIENT
Start: 2023-08-21 | End: 2023-08-21 | Stop reason: HOSPADM

## 2023-08-21 RX ORDER — LIDOCAINE HCL/PF 100 MG/5ML
SYRINGE (ML) INJECTION PRN
Status: DISCONTINUED | OUTPATIENT
Start: 2023-08-21 | End: 2023-08-21 | Stop reason: SDUPTHER

## 2023-08-21 RX ORDER — SODIUM CHLORIDE 0.9 % (FLUSH) 0.9 %
5-40 SYRINGE (ML) INJECTION PRN
Status: DISCONTINUED | OUTPATIENT
Start: 2023-08-21 | End: 2023-08-21 | Stop reason: HOSPADM

## 2023-08-21 RX ORDER — DEXAMETHASONE SODIUM PHOSPHATE 10 MG/ML
INJECTION, EMULSION INTRAMUSCULAR; INTRAVENOUS PRN
Status: DISCONTINUED | OUTPATIENT
Start: 2023-08-21 | End: 2023-08-21 | Stop reason: SDUPTHER

## 2023-08-21 RX ORDER — HYDROCODONE BITARTRATE AND ACETAMINOPHEN 5; 325 MG/1; MG/1
1 TABLET ORAL ONCE
Status: COMPLETED | OUTPATIENT
Start: 2023-08-21 | End: 2023-08-21

## 2023-08-21 RX ORDER — FENTANYL CITRATE 50 UG/ML
50 INJECTION, SOLUTION INTRAMUSCULAR; INTRAVENOUS EVERY 5 MIN PRN
Status: DISCONTINUED | OUTPATIENT
Start: 2023-08-21 | End: 2023-08-21 | Stop reason: HOSPADM

## 2023-08-21 RX ORDER — PROPOFOL 10 MG/ML
INJECTION, EMULSION INTRAVENOUS PRN
Status: DISCONTINUED | OUTPATIENT
Start: 2023-08-21 | End: 2023-08-21 | Stop reason: SDUPTHER

## 2023-08-21 RX ORDER — FENTANYL CITRATE 50 UG/ML
INJECTION, SOLUTION INTRAMUSCULAR; INTRAVENOUS PRN
Status: DISCONTINUED | OUTPATIENT
Start: 2023-08-21 | End: 2023-08-21 | Stop reason: SDUPTHER

## 2023-08-21 RX ORDER — FENTANYL CITRATE 50 UG/ML
25 INJECTION, SOLUTION INTRAMUSCULAR; INTRAVENOUS EVERY 5 MIN PRN
Status: DISCONTINUED | OUTPATIENT
Start: 2023-08-21 | End: 2023-08-21 | Stop reason: HOSPADM

## 2023-08-21 RX ORDER — DOXYCYCLINE HYCLATE 100 MG
100 TABLET ORAL 2 TIMES DAILY
Qty: 6 TABLET | Refills: 0 | Status: SHIPPED | OUTPATIENT
Start: 2023-08-21 | End: 2023-08-24

## 2023-08-21 RX ORDER — EPHEDRINE SULFATE/0.9% NACL/PF 50 MG/5 ML
SYRINGE (ML) INTRAVENOUS PRN
Status: DISCONTINUED | OUTPATIENT
Start: 2023-08-21 | End: 2023-08-21 | Stop reason: SDUPTHER

## 2023-08-21 RX ORDER — HYDROCODONE BITARTRATE AND ACETAMINOPHEN 5; 325 MG/1; MG/1
1 TABLET ORAL EVERY 4 HOURS PRN
Qty: 12 TABLET | Refills: 0 | Status: SHIPPED | OUTPATIENT
Start: 2023-08-21 | End: 2023-08-24

## 2023-08-21 RX ORDER — PHENAZOPYRIDINE HYDROCHLORIDE 100 MG/1
100 TABLET, FILM COATED ORAL 3 TIMES DAILY PRN
Qty: 9 TABLET | Refills: 0 | Status: SHIPPED | OUTPATIENT
Start: 2023-08-21 | End: 2023-08-24

## 2023-08-21 RX ORDER — PHENYLEPHRINE HCL IN 0.9% NACL 1 MG/10 ML
SYRINGE (ML) INTRAVENOUS PRN
Status: DISCONTINUED | OUTPATIENT
Start: 2023-08-21 | End: 2023-08-21 | Stop reason: SDUPTHER

## 2023-08-21 RX ORDER — OXYBUTYNIN CHLORIDE 5 MG/1
5 TABLET, EXTENDED RELEASE ORAL DAILY
Qty: 7 TABLET | Refills: 0 | Status: SHIPPED | OUTPATIENT
Start: 2023-08-21 | End: 2023-08-28

## 2023-08-21 RX ADMIN — Medication 100 MCG: at 11:29

## 2023-08-21 RX ADMIN — FENTANYL CITRATE 50 MCG: 50 INJECTION, SOLUTION INTRAMUSCULAR; INTRAVENOUS at 11:25

## 2023-08-21 RX ADMIN — PROPOFOL 150 MG: 10 INJECTION, EMULSION INTRAVENOUS at 11:27

## 2023-08-21 RX ADMIN — SODIUM CHLORIDE: 9 INJECTION, SOLUTION INTRAVENOUS at 11:56

## 2023-08-21 RX ADMIN — DEXAMETHASONE SODIUM PHOSPHATE 10 MG: 10 INJECTION, EMULSION INTRAMUSCULAR; INTRAVENOUS at 11:37

## 2023-08-21 RX ADMIN — Medication 200 MCG: at 11:51

## 2023-08-21 RX ADMIN — Medication 2000 MG: at 11:30

## 2023-08-21 RX ADMIN — Medication 100 MG: at 11:27

## 2023-08-21 RX ADMIN — Medication 10 MG: at 11:39

## 2023-08-21 RX ADMIN — Medication 200 MCG: at 11:46

## 2023-08-21 RX ADMIN — Medication 10 MG: at 11:43

## 2023-08-21 RX ADMIN — HYDROCODONE BITARTRATE AND ACETAMINOPHEN 1 TABLET: 5; 325 TABLET ORAL at 12:50

## 2023-08-21 RX ADMIN — Medication 150 MCG: at 11:33

## 2023-08-21 RX ADMIN — ONDANSETRON 4 MG: 2 INJECTION INTRAMUSCULAR; INTRAVENOUS at 11:37

## 2023-08-21 RX ADMIN — Medication 10 MG: at 11:51

## 2023-08-21 RX ADMIN — SODIUM CHLORIDE: 9 INJECTION, SOLUTION INTRAVENOUS at 08:37

## 2023-08-21 ASSESSMENT — PAIN SCALES - GENERAL
PAINLEVEL_OUTOF10: 8
PAINLEVEL_OUTOF10: 0
PAINLEVEL_OUTOF10: 6
PAINLEVEL_OUTOF10: 5

## 2023-08-21 ASSESSMENT — PAIN DESCRIPTION - DESCRIPTORS: DESCRIPTORS: PRESSURE

## 2023-08-21 NOTE — PROGRESS NOTES
Admitted to Same Day Surgery and oriented to the unit. Patient verbalized approval for first name, last initial and physician name on the unit white board. Fall and allergy band on patient.  Juwan at bedside.

## 2023-08-21 NOTE — PROGRESS NOTES
[facility]. Vitals and assessment as charted. 0.9 infusing, @950ml to count from PACU. Pt has muffin and water. Family at the bedside. Pt and family verbalized understanding of discharge criteria and call light use. Call light in reach.

## 2023-08-21 NOTE — OP NOTE
FACILITY:  [address] [place] [address]   1965  [id number]    DATE: 08/21/23  SURGEON:  Dr. .Cephus Dance, MD, MD  ASSISTANT: Dr. Cephus Dance, MD MD  PREOPERATIVE DIAGNOSIS: Right sided kidney stone  POSTOPERATIVE DIAGNOSIS: Right sided kidney stone  PROCEDURES PERFORMED:  1. Cystoscopy. 2. Right sided ureteroscopy with holmium laser lithotripsy  3. Right sided stent placement. DRAINS: A Right sided 6x26 double J ureteral stent ( with string)  SPECIMEN: none  ANESTHESIA: General  ESTIMATED BLOOD LOSS: None. COMPLICATIONS: None. INDICATIONS FOR PROCEDURE:  Noemy Sorenson is a 62 y.o. female presents for Right sided calculus. After the risks, benefits, alternatives, of the procedure were discussed with the patient, informed consent was obtained. The patient elected to proceed. DETAILS OF THE PROCEDURE:  The patient was brought back from the preoperative holding area to the  operating suite, and was transferred to the operating table where the patient lay in  supine position. EPC's were in place, connected to the machine and the machine was turned on before induction. General endotracheal anesthesia was induced, and patient was prepped and draped in standard surgical fashion after being placed in dorsolithotomy position. A proper timeout was performed, preoperative antibiotics were given. We began by inserting a cystoscope with a 22 Japanese sheath and 30 degree lens into the patient's urethral meatus and advancing into the bladder without complication. A pan cystoscopy was preformed and the bladder appeared unremarkable. We then focused our attention on the Right ureteral orifice, which we cannulated with our glidewire, advanced up to renal pelvis. We then used a dual lumen catheter to place a second wire. Once in good position, we advanced our flexible ureteroscope over the working wire to the renal pelvis under direct visualization.   We identified the renal

## 2023-08-21 NOTE — DISCHARGE INSTRUCTIONS
Discharge instructions: Ureteroscopy lithotripsy and stent placement (with string): You may see blood in the urine after the procedure. This should resolve over the next couple days. Please stay hydrated. You may see intermittent blood in the urine while the stent is in place. This is expected. You may experience flank pain, and/or frequency/urgency of urination while the stent is in place. Please use Oxybutynin and Flomax (Tamsulosin) to help with these symptoms. Pt ok to discharge home in good condition  No heavy lifting, >10 lbs for today  Pt should avoid strenuous activity for today  Pt should walk moderately at home  Pt ok to shower   Pt may resume diet as tolerated  Pt should take Rx as directed  No driving while on narcotics  Please call attending physician or hospital  with questions  Call or Present to ED if fever (> 101F), intractable nausea vomiting or pain. Rx in chart    Pt should follow up with Dr. Radha Henley MD, in 3 months with SCOTT and lilia, call to confirm appointment. Pt should Pull stent in the morning of 8/24. There may be some pain associated with the stent removal, which is usually self limiting. We suggest using the pain medication prescribed for you and a nonsteroidal anti-inflammatory such as Ibuprofen, if you are able to take this medication, to control symptoms. Take Ibuprofen as directed for 24 hrs after stent pull. Please stay hydrated. Please call with questions.

## 2023-08-21 NOTE — PROGRESS NOTES
1208 Awake and oriented on arrival to PACU , HOB [id number] , denies any pain or nausea   1220 resting resp easy   1235 pt awakens to name , continue to deny pain or nausea   1238 meets criteria for discharge , transported to [facility]

## 2023-08-22 DIAGNOSIS — N20.0 KIDNEY STONE: Primary | ICD-10-CM

## 2023-08-30 LAB
AVERAGE GLUCOSE: NORMAL
BUN BLDV-MCNC: 16 MG/DL
CALCIUM SERPL-MCNC: 8.7 MG/DL
CHLORIDE BLD-SCNC: 106 MMOL/L
CO2: 24 MMOL/L
CREAT SERPL-MCNC: 1.05 MG/DL
EGFR: 62
GLUCOSE BLD-MCNC: 115 MG/DL
HBA1C MFR BLD: 6.2 %
POTASSIUM SERPL-SCNC: 4.8 MMOL/L
SODIUM BLD-SCNC: 142 MMOL/L

## 2023-09-06 ENCOUNTER — OFFICE VISIT (OUTPATIENT)
Dept: FAMILY MEDICINE CLINIC | Age: 58
End: 2023-09-06
Payer: COMMERCIAL

## 2023-09-06 VITALS
DIASTOLIC BLOOD PRESSURE: 68 MMHG | WEIGHT: 188.8 LBS | BODY MASS INDEX: 36.87 KG/M2 | HEART RATE: 84 BPM | SYSTOLIC BLOOD PRESSURE: 108 MMHG | RESPIRATION RATE: 16 BRPM

## 2023-09-06 DIAGNOSIS — Z00.00 WELL ADULT HEALTH CHECK: Primary | ICD-10-CM

## 2023-09-06 DIAGNOSIS — E78.00 PURE HYPERCHOLESTEROLEMIA: ICD-10-CM

## 2023-09-06 DIAGNOSIS — E11.9 DIET-CONTROLLED DIABETES MELLITUS (HCC): ICD-10-CM

## 2023-09-06 DIAGNOSIS — Q60.5 CONGENITAL SMALL KIDNEY: ICD-10-CM

## 2023-09-06 DIAGNOSIS — I10 HYPERTENSION, UNSPECIFIED TYPE: ICD-10-CM

## 2023-09-06 PROCEDURE — 3074F SYST BP LT 130 MM HG: CPT | Performed by: FAMILY MEDICINE

## 2023-09-06 PROCEDURE — 99396 PREV VISIT EST AGE 40-64: CPT | Performed by: FAMILY MEDICINE

## 2023-09-06 PROCEDURE — 3078F DIAST BP <80 MM HG: CPT | Performed by: FAMILY MEDICINE

## 2023-09-06 RX ORDER — AMLODIPINE BESYLATE AND BENAZEPRIL HYDROCHLORIDE 5; 10 MG/1; MG/1
1 CAPSULE ORAL DAILY
Qty: 90 CAPSULE | Refills: 3 | Status: SHIPPED | OUTPATIENT
Start: 2023-09-06

## 2023-09-06 ASSESSMENT — ENCOUNTER SYMPTOMS
RESPIRATORY NEGATIVE: 1
GASTROINTESTINAL NEGATIVE: 1

## 2023-11-08 ENCOUNTER — HOSPITAL ENCOUNTER (OUTPATIENT)
Dept: GENERAL RADIOLOGY | Age: 58
Discharge: HOME | End: 2023-11-08
Payer: COMMERCIAL

## 2023-11-08 ENCOUNTER — HOSPITAL ENCOUNTER (OUTPATIENT)
Age: 58
Discharge: HOME | End: 2023-11-08
Payer: COMMERCIAL

## 2023-11-08 DIAGNOSIS — N20.0 KIDNEY STONE: ICD-10-CM

## 2023-11-08 PROCEDURE — 74018 RADEX ABDOMEN 1 VIEW: CPT

## 2023-11-17 ENCOUNTER — OFFICE VISIT (OUTPATIENT)
Dept: UROLOGY | Age: 58
End: 2023-11-17
Payer: COMMERCIAL

## 2023-11-17 VITALS
WEIGHT: 196 LBS | BODY MASS INDEX: 38.48 KG/M2 | SYSTOLIC BLOOD PRESSURE: 120 MMHG | DIASTOLIC BLOOD PRESSURE: 80 MMHG | HEIGHT: 60 IN

## 2023-11-17 DIAGNOSIS — N20.0 KIDNEY STONE: Primary | ICD-10-CM

## 2023-11-17 PROCEDURE — G8427 DOCREV CUR MEDS BY ELIG CLIN: HCPCS | Performed by: UROLOGY

## 2023-11-17 PROCEDURE — 3017F COLORECTAL CA SCREEN DOC REV: CPT | Performed by: UROLOGY

## 2023-11-17 PROCEDURE — G8417 CALC BMI ABV UP PARAM F/U: HCPCS | Performed by: UROLOGY

## 2023-11-17 PROCEDURE — 3079F DIAST BP 80-89 MM HG: CPT | Performed by: UROLOGY

## 2023-11-17 PROCEDURE — 99212 OFFICE O/P EST SF 10 MIN: CPT | Performed by: UROLOGY

## 2023-11-17 PROCEDURE — 3074F SYST BP LT 130 MM HG: CPT | Performed by: UROLOGY

## 2023-11-17 PROCEDURE — G8484 FLU IMMUNIZE NO ADMIN: HCPCS | Performed by: UROLOGY

## 2023-11-17 PROCEDURE — 1036F TOBACCO NON-USER: CPT | Performed by: UROLOGY

## 2024-02-28 ENCOUNTER — TELEPHONE (OUTPATIENT)
Dept: FAMILY MEDICINE CLINIC | Age: 59
End: 2024-02-28

## 2024-02-28 DIAGNOSIS — I10 HYPERTENSION, UNSPECIFIED TYPE: ICD-10-CM

## 2024-02-28 DIAGNOSIS — E78.00 PURE HYPERCHOLESTEROLEMIA: ICD-10-CM

## 2024-02-28 DIAGNOSIS — Q60.5 CONGENITAL SMALL KIDNEY: ICD-10-CM

## 2024-02-28 DIAGNOSIS — D50.9 IRON DEFICIENCY ANEMIA, UNSPECIFIED IRON DEFICIENCY ANEMIA TYPE: ICD-10-CM

## 2024-02-28 DIAGNOSIS — E11.9 DIET-CONTROLLED DIABETES MELLITUS (HCC): Primary | ICD-10-CM

## 2024-02-28 NOTE — TELEPHONE ENCOUNTER
----- Message from Meghana Toussaint sent at 2/28/2024  9:28 AM EST -----  Subject: Referral Request    Reason for referral request? DM follow up blood work  Provider patient wants to be referred to(if known):     Provider Phone Number(if known):    Additional Information for Provider? Would like ordered prior to 3/7 appt   ---------------------------------------------------------------------------  --------------  CALL BACK INFO    [phone]; OK to leave message on voicemail  ---------------------------------------------------------------------------  --------------

## 2024-02-29 ASSESSMENT — PATIENT HEALTH QUESTIONNAIRE - PHQ9
2. FEELING DOWN, DEPRESSED OR HOPELESS: NOT AT ALL
SUM OF ALL RESPONSES TO PHQ QUESTIONS 1-9: 0
SUM OF ALL RESPONSES TO PHQ QUESTIONS 1-9: 0
1. LITTLE INTEREST OR PLEASURE IN DOING THINGS: NOT AT ALL
SUM OF ALL RESPONSES TO PHQ QUESTIONS 1-9: 0
2. FEELING DOWN, DEPRESSED OR HOPELESS: 0
SUM OF ALL RESPONSES TO PHQ QUESTIONS 1-9: 0
SUM OF ALL RESPONSES TO PHQ9 QUESTIONS 1 & 2: 0
SUM OF ALL RESPONSES TO PHQ9 QUESTIONS 1 & 2: 0
1. LITTLE INTEREST OR PLEASURE IN DOING THINGS: 0

## 2024-03-04 LAB
ALBUMIN SERPL-MCNC: 4.4 G/DL
ALP BLD-CCNC: 77 U/L
ALT SERPL-CCNC: 24 U/L
ANION GAP SERPL CALCULATED.3IONS-SCNC: 1.6 MMOL/L
AST SERPL-CCNC: 18 U/L
BILIRUB SERPL-MCNC: 0.8 MG/DL (ref 0.1–1.4)
BUN BLDV-MCNC: 12 MG/DL
CALCIUM SERPL-MCNC: 8.9 MG/DL
CHLORIDE BLD-SCNC: 102 MMOL/L
CHOLESTEROL, TOTAL: 243 MG/DL
CHOLESTEROL/HDL RATIO: NORMAL
CO2: 23 MMOL/L
CREAT SERPL-MCNC: 1.15 MG/DL
EGFR: 55
GLUCOSE BLD-MCNC: 131 MG/DL
HDLC SERPL-MCNC: 43 MG/DL (ref 35–70)
LDL CHOLESTEROL CALCULATED: 27 MG/DL (ref 0–160)
NONHDLC SERPL-MCNC: NORMAL MG/DL
POTASSIUM SERPL-SCNC: 5.1 MMOL/L
SODIUM BLD-SCNC: 140 MMOL/L
TOTAL PROTEIN: 7.2
TRIGL SERPL-MCNC: 146 MG/DL
TSH SERPL DL<=0.05 MIU/L-ACNC: 2 UIU/ML
VLDLC SERPL CALC-MCNC: NORMAL MG/DL

## 2024-03-07 ENCOUNTER — OFFICE VISIT (OUTPATIENT)
Dept: FAMILY MEDICINE CLINIC | Age: 59
End: 2024-03-07
Payer: COMMERCIAL

## 2024-03-07 VITALS
DIASTOLIC BLOOD PRESSURE: 78 MMHG | HEART RATE: 76 BPM | RESPIRATION RATE: 16 BRPM | SYSTOLIC BLOOD PRESSURE: 100 MMHG | BODY MASS INDEX: 39.59 KG/M2 | WEIGHT: 202.7 LBS

## 2024-03-07 DIAGNOSIS — E78.00 PURE HYPERCHOLESTEROLEMIA: ICD-10-CM

## 2024-03-07 DIAGNOSIS — N18.30 STAGE 3 CHRONIC KIDNEY DISEASE, UNSPECIFIED WHETHER STAGE 3A OR 3B CKD (HCC): ICD-10-CM

## 2024-03-07 DIAGNOSIS — I10 HYPERTENSION, UNSPECIFIED TYPE: ICD-10-CM

## 2024-03-07 DIAGNOSIS — E11.9 DIET-CONTROLLED DIABETES MELLITUS (HCC): Primary | ICD-10-CM

## 2024-03-07 DIAGNOSIS — Q60.5 CONGENITAL SMALL KIDNEY: ICD-10-CM

## 2024-03-07 LAB — HBA1C MFR BLD: 6.6 %

## 2024-03-07 PROCEDURE — G8427 DOCREV CUR MEDS BY ELIG CLIN: HCPCS | Performed by: FAMILY MEDICINE

## 2024-03-07 PROCEDURE — 83036 HEMOGLOBIN GLYCOSYLATED A1C: CPT | Performed by: FAMILY MEDICINE

## 2024-03-07 PROCEDURE — 2022F DILAT RTA XM EVC RTNOPTHY: CPT | Performed by: FAMILY MEDICINE

## 2024-03-07 PROCEDURE — 3074F SYST BP LT 130 MM HG: CPT | Performed by: FAMILY MEDICINE

## 2024-03-07 PROCEDURE — 3044F HG A1C LEVEL LT 7.0%: CPT | Performed by: FAMILY MEDICINE

## 2024-03-07 PROCEDURE — 3078F DIAST BP <80 MM HG: CPT | Performed by: FAMILY MEDICINE

## 2024-03-07 PROCEDURE — 1036F TOBACCO NON-USER: CPT | Performed by: FAMILY MEDICINE

## 2024-03-07 PROCEDURE — G2211 COMPLEX E/M VISIT ADD ON: HCPCS | Performed by: FAMILY MEDICINE

## 2024-03-07 PROCEDURE — G8417 CALC BMI ABV UP PARAM F/U: HCPCS | Performed by: FAMILY MEDICINE

## 2024-03-07 PROCEDURE — 3017F COLORECTAL CA SCREEN DOC REV: CPT | Performed by: FAMILY MEDICINE

## 2024-03-07 PROCEDURE — G8484 FLU IMMUNIZE NO ADMIN: HCPCS | Performed by: FAMILY MEDICINE

## 2024-03-07 PROCEDURE — 99214 OFFICE O/P EST MOD 30 MIN: CPT | Performed by: FAMILY MEDICINE

## 2024-03-07 SDOH — ECONOMIC STABILITY: FOOD INSECURITY: WITHIN THE PAST 12 MONTHS, YOU WORRIED THAT YOUR FOOD WOULD RUN OUT BEFORE YOU GOT MONEY TO BUY MORE.: NEVER TRUE

## 2024-03-07 SDOH — ECONOMIC STABILITY: INCOME INSECURITY: HOW HARD IS IT FOR YOU TO PAY FOR THE VERY BASICS LIKE FOOD, HOUSING, MEDICAL CARE, AND HEATING?: NOT HARD AT ALL

## 2024-03-07 SDOH — ECONOMIC STABILITY: FOOD INSECURITY: WITHIN THE PAST 12 MONTHS, THE FOOD YOU BOUGHT JUST DIDN'T LAST AND YOU DIDN'T HAVE MONEY TO GET MORE.: NEVER TRUE

## 2024-03-07 ASSESSMENT — ENCOUNTER SYMPTOMS
GASTROINTESTINAL NEGATIVE: 1
RESPIRATORY NEGATIVE: 1

## 2024-03-07 NOTE — PROGRESS NOTES
Shant Atkins (:  1965) is a 58 y.o. female,Established patient, here for evaluation of the following chief complaint(s):  6 Month Follow-Up, Diabetes, and Discuss Labs          Subjective   SUBJECTIVE/OBJECTIVE:  HPI  Chief Complaint   Patient presents with    6 Month Follow-Up    Diabetes    Discuss Labs     6 month eval.    A1C jumped a little as did her weight over the winter.  Lab Results   Component Value Date    LABA1C 6.6 2024    LABA1C 6.2 2023    LABA1C 6.5 2023     Lab Results   Component Value Date    LDLCALC 27 2024    CREATININE 1.15 2024     BP Readings from Last 3 Encounters:   24 100/78   23 120/80   23 108/68     Wt Readings from Last 3 Encounters:   24 91.9 kg (202 lb 11.2 oz)   23 88.9 kg (196 lb)   23 85.6 kg (188 lb 12.8 oz)     The 10-year ASCVD risk score (Jacobo PIKE, et al., 2019) is: 6.1%    Values used to calculate the score:      Age: 58 years      Sex: Female      Is Non- : No      Diabetic: Yes      Tobacco smoker: No      Systolic Blood Pressure: 100 mmHg      Is BP treated: Yes      HDL Cholesterol: 43 mg/dL      Total Cholesterol: 243 mg/dL    Patient Active Problem List   Diagnosis    Congenital small kidney    Hypertension    Sepsis due to urinary tract infection (HCC)       Current Outpatient Medications   Medication Sig Dispense Refill    Biotin w/ Vitamins C & E (HAIR/SKIN/NAILS PO) Take 2 each by mouth daily      amLODIPine-benazepril (LOTREL) 5-10 MG per capsule Take 1 capsule by mouth daily 90 capsule 3    FLUoxetine (PROZAC) 20 MG capsule Take 1 capsule by mouth Take one tablet 14 days out of the month       No current facility-administered medications for this visit.       Past Surgical History:   Procedure Laterality Date    BREAST SURGERY       SECTION   &     COLONOSCOPY  10/31/2017    COLONOSCOPY N/A 2021    COLONOSCOPY performed by Rashid Olsen,

## 2024-09-03 ENCOUNTER — LAB (OUTPATIENT)
Dept: LAB | Age: 59
End: 2024-09-03

## 2024-09-03 DIAGNOSIS — E78.00 PURE HYPERCHOLESTEROLEMIA: ICD-10-CM

## 2024-09-03 DIAGNOSIS — E11.9 DIET-CONTROLLED DIABETES MELLITUS (HCC): ICD-10-CM

## 2024-09-03 LAB
ANION GAP SERPL CALC-SCNC: 11 MEQ/L (ref 8–16)
BUN SERPL-MCNC: 15 MG/DL (ref 7–22)
CALCIUM SERPL-MCNC: 9 MG/DL (ref 8.5–10.5)
CHLORIDE SERPL-SCNC: 102 MEQ/L (ref 98–111)
CHOLEST SERPL-MCNC: 202 MG/DL (ref 100–199)
CO2 SERPL-SCNC: 26 MEQ/L (ref 23–33)
CREAT SERPL-MCNC: 1 MG/DL (ref 0.4–1.2)
DEPRECATED MEAN GLUCOSE BLD GHB EST-ACNC: 126 MG/DL (ref 70–126)
GFR SERPL CREATININE-BSD FRML MDRD: 65 ML/MIN/1.73M2
GLUCOSE SERPL-MCNC: 146 MG/DL (ref 70–108)
HBA1C MFR BLD HPLC: 6.2 % (ref 4.4–6.4)
HDLC SERPL-MCNC: 40 MG/DL
LDLC SERPL CALC-MCNC: 129 MG/DL
POTASSIUM SERPL-SCNC: 4.6 MEQ/L (ref 3.5–5.2)
SODIUM SERPL-SCNC: 139 MEQ/L (ref 135–145)
TRIGL SERPL-MCNC: 167 MG/DL (ref 0–199)

## 2024-09-09 ENCOUNTER — TELEMEDICINE (OUTPATIENT)
Dept: FAMILY MEDICINE CLINIC | Age: 59
End: 2024-09-09
Payer: COMMERCIAL

## 2024-09-09 DIAGNOSIS — I10 HYPERTENSION, UNSPECIFIED TYPE: ICD-10-CM

## 2024-09-09 DIAGNOSIS — E11.9 DIET-CONTROLLED DIABETES MELLITUS (HCC): Primary | ICD-10-CM

## 2024-09-09 DIAGNOSIS — N18.30 STAGE 3 CHRONIC KIDNEY DISEASE, UNSPECIFIED WHETHER STAGE 3A OR 3B CKD (HCC): ICD-10-CM

## 2024-09-09 DIAGNOSIS — E78.00 PURE HYPERCHOLESTEROLEMIA: ICD-10-CM

## 2024-09-09 PROCEDURE — G2211 COMPLEX E/M VISIT ADD ON: HCPCS | Performed by: FAMILY MEDICINE

## 2024-09-09 PROCEDURE — 2022F DILAT RTA XM EVC RTNOPTHY: CPT | Performed by: FAMILY MEDICINE

## 2024-09-09 PROCEDURE — 3017F COLORECTAL CA SCREEN DOC REV: CPT | Performed by: FAMILY MEDICINE

## 2024-09-09 PROCEDURE — 99214 OFFICE O/P EST MOD 30 MIN: CPT | Performed by: FAMILY MEDICINE

## 2024-09-09 PROCEDURE — G8428 CUR MEDS NOT DOCUMENT: HCPCS | Performed by: FAMILY MEDICINE

## 2024-09-09 PROCEDURE — 3044F HG A1C LEVEL LT 7.0%: CPT | Performed by: FAMILY MEDICINE

## 2024-09-09 ASSESSMENT — ENCOUNTER SYMPTOMS
GASTROINTESTINAL NEGATIVE: 1
RESPIRATORY NEGATIVE: 1

## 2024-09-27 ENCOUNTER — TELEPHONE (OUTPATIENT)
Dept: FAMILY MEDICINE CLINIC | Age: 59
End: 2024-09-27

## 2024-09-27 DIAGNOSIS — Z00.00 WELL ADULT HEALTH CHECK: Primary | ICD-10-CM

## 2024-10-03 ENCOUNTER — TELEPHONE (OUTPATIENT)
Dept: FAMILY MEDICINE CLINIC | Age: 59
End: 2024-10-03

## 2024-10-03 NOTE — TELEPHONE ENCOUNTER
Patient had a Virtual Visit/ Physical on 9-9-24. She states since the visit was a wellness it should be covered, however, she received a bill for $15.00. Please review coding on this appointment.

## 2024-11-11 ENCOUNTER — TELEPHONE (OUTPATIENT)
Dept: FAMILY MEDICINE CLINIC | Age: 59
End: 2024-11-11

## 2024-11-11 RX ORDER — NITROFURANTOIN 25; 75 MG/1; MG/1
100 CAPSULE ORAL 2 TIMES DAILY
Qty: 10 CAPSULE | Refills: 0 | Status: SHIPPED | OUTPATIENT
Start: 2024-11-11 | End: 2024-11-16

## 2024-11-11 NOTE — TELEPHONE ENCOUNTER
Patient called and wanted to schedule an appointment. Offered tomorrow morning. Patient stated that she has a UTI. She stated that she had urgency and burning however she took over the counter UTI medication and it has helped a little but now she is having lower back pain.     She would like something to treat the UTI sent to [facility] pharmacy.     She will check with pharmacy after 2:00 today.

## 2025-01-14 ENCOUNTER — TELEPHONE (OUTPATIENT)
Dept: FAMILY MEDICINE CLINIC | Age: 60
End: 2025-01-14

## 2025-01-14 RX ORDER — PREDNISONE 20 MG/1
TABLET ORAL
Qty: 25 TABLET | Refills: 0 | Status: SHIPPED | OUTPATIENT
Start: 2025-01-14

## 2025-01-14 NOTE — TELEPHONE ENCOUNTER
Received a call from Dr. [facility]. Dr. Mg seen patient today and believes she has something going on with Right C5 into her shoulder. He believes a prednisone taper would be beneficial. Stated she has kidney dysfunction and is unable to take Ibuporfen. Please advise.

## 2025-08-27 DIAGNOSIS — I10 HYPERTENSION, UNSPECIFIED TYPE: ICD-10-CM

## 2025-08-28 RX ORDER — AMLODIPINE AND BENAZEPRIL HYDROCHLORIDE 5; 10 MG/1; MG/1
CAPSULE ORAL DAILY
Qty: 90 CAPSULE | Refills: 0 | Status: SHIPPED | OUTPATIENT
Start: 2025-08-28

## (undated) DEVICE — SOLUTION IRRIG 1000ML STRL H2O USP PLAS POUR BTL

## (undated) DEVICE — GLOVE ORTHO 8   MSG9480

## (undated) DEVICE — CYSTO: Brand: MEDLINE INDUSTRIES, INC.

## (undated) DEVICE — ADAPTER URO SCP UROLOK LL

## (undated) DEVICE — SYSTEM PMP SGL ACT W/ 10CC VAC SYR 1 W VLV FOR ENDOSCP SURG

## (undated) DEVICE — CATHETER ETER IV L1IN OD22GA POLYUR PERIPH WNG HUB SFTY SHLD

## (undated) DEVICE — Device

## (undated) DEVICE — DUAL LUMEN URETERAL CATHETER

## (undated) DEVICE — TUBING IV STOPCOCK 48 CM 3 W

## (undated) DEVICE — IV START KIT: Brand: MEDLINE INDUSTRIES, INC.

## (undated) DEVICE — SOLUTION IRRIG 3000ML 0.9% SOD CHL USP UROMATIC PLAS CONT

## (undated) DEVICE — SET ADMIN 25ML L117IN PMP MOD CK VLV RLER CLMP 2 SMRTSITE

## (undated) DEVICE — GUIDEWIRE URO L150CM DIA .035IN STIFF NIT HYDRPHL STR TIP

## (undated) DEVICE — SINGLE-USE DIGITAL FLEXIBLE URETEROSCOPE: Brand: LITHOVUE

## (undated) DEVICE — GUIDEWIRE URO L150CM DIA0.035IN STIFF NIT HYDRPHLC STR TIP

## (undated) DEVICE — SOLUTION IV 1000ML 0.45% SOD CHL PH 5 INJ USP VIAFLX PLAS